# Patient Record
Sex: MALE | Race: WHITE | Employment: FULL TIME | URBAN - METROPOLITAN AREA
[De-identification: names, ages, dates, MRNs, and addresses within clinical notes are randomized per-mention and may not be internally consistent; named-entity substitution may affect disease eponyms.]

---

## 2021-08-09 ENCOUNTER — HOSPITAL ENCOUNTER (INPATIENT)
Age: 21
LOS: 3 days | Discharge: HOME | DRG: 682 | End: 2021-08-12
Attending: EMERGENCY MEDICINE | Admitting: INTERNAL MEDICINE
Payer: COMMERCIAL

## 2021-08-09 ENCOUNTER — APPOINTMENT (OUTPATIENT)
Dept: GENERAL RADIOLOGY | Age: 21
DRG: 682 | End: 2021-08-09
Attending: EMERGENCY MEDICINE
Payer: COMMERCIAL

## 2021-08-09 DIAGNOSIS — N17.9 AKI (ACUTE KIDNEY INJURY) (HCC): ICD-10-CM

## 2021-08-09 DIAGNOSIS — R07.9 CHEST PAIN, UNSPECIFIED TYPE: ICD-10-CM

## 2021-08-09 DIAGNOSIS — I47.1 SVT (SUPRAVENTRICULAR TACHYCARDIA) (HCC): ICD-10-CM

## 2021-08-09 DIAGNOSIS — E87.6 HYPOKALEMIA: Primary | ICD-10-CM

## 2021-08-09 PROBLEM — R00.0 TACHYCARDIA: Status: ACTIVE | Noted: 2021-08-09

## 2021-08-09 LAB
ALBUMIN SERPL-MCNC: 3.8 G/DL (ref 3.5–5)
ALBUMIN/GLOB SERPL: 1.3 {RATIO} (ref 1.1–2.2)
ALP SERPL-CCNC: 110 U/L (ref 45–117)
ALT SERPL-CCNC: 34 U/L (ref 12–78)
AMPHET UR QL SCN: NEGATIVE
ANION GAP SERPL CALC-SCNC: 13 MMOL/L (ref 5–15)
AST SERPL-CCNC: 20 U/L (ref 15–37)
ATRIAL RATE: 122 BPM
ATRIAL RATE: 136 BPM
BARBITURATES UR QL SCN: NEGATIVE
BASOPHILS # BLD: 0.1 K/UL (ref 0–0.1)
BASOPHILS NFR BLD: 1 % (ref 0–1)
BENZODIAZ UR QL: NEGATIVE
BILIRUB SERPL-MCNC: 0.3 MG/DL (ref 0.2–1)
BUN SERPL-MCNC: 20 MG/DL (ref 6–20)
BUN/CREAT SERPL: 12 (ref 12–20)
CALCIUM SERPL-MCNC: 8.4 MG/DL (ref 8.5–10.1)
CALCULATED P AXIS, ECG09: 68 DEGREES
CALCULATED P AXIS, ECG09: 80 DEGREES
CALCULATED R AXIS, ECG10: 94 DEGREES
CALCULATED R AXIS, ECG10: 97 DEGREES
CALCULATED T AXIS, ECG11: -17 DEGREES
CALCULATED T AXIS, ECG11: -9 DEGREES
CANNABINOIDS UR QL SCN: NEGATIVE
CHLORIDE SERPL-SCNC: 108 MMOL/L (ref 97–108)
CO2 SERPL-SCNC: 21 MMOL/L (ref 21–32)
COCAINE UR QL SCN: NEGATIVE
CREAT SERPL-MCNC: 1.61 MG/DL (ref 0.7–1.3)
DIAGNOSIS, 93000: NORMAL
DIAGNOSIS, 93000: NORMAL
DIFFERENTIAL METHOD BLD: ABNORMAL
DRUG SCRN COMMENT,DRGCM: NORMAL
EOSINOPHIL # BLD: 0 K/UL (ref 0–0.4)
EOSINOPHIL NFR BLD: 0 % (ref 0–7)
ERYTHROCYTE [DISTWIDTH] IN BLOOD BY AUTOMATED COUNT: 13.4 % (ref 11.5–14.5)
GLOBULIN SER CALC-MCNC: 2.9 G/DL (ref 2–4)
GLUCOSE SERPL-MCNC: 275 MG/DL (ref 65–100)
HCT VFR BLD AUTO: 48.1 % (ref 36.6–50.3)
HGB BLD-MCNC: 16.2 G/DL (ref 12.1–17)
IMM GRANULOCYTES # BLD AUTO: 0.1 K/UL (ref 0–0.04)
IMM GRANULOCYTES NFR BLD AUTO: 1 % (ref 0–0.5)
LYMPHOCYTES # BLD: 1.9 K/UL (ref 0.8–3.5)
LYMPHOCYTES NFR BLD: 11 % (ref 12–49)
MCH RBC QN AUTO: 29.6 PG (ref 26–34)
MCHC RBC AUTO-ENTMCNC: 33.7 G/DL (ref 30–36.5)
MCV RBC AUTO: 87.9 FL (ref 80–99)
METHADONE UR QL: NEGATIVE
MONOCYTES # BLD: 1.3 K/UL (ref 0–1)
MONOCYTES NFR BLD: 8 % (ref 5–13)
NEUTS SEG # BLD: 13.8 K/UL (ref 1.8–8)
NEUTS SEG NFR BLD: 79 % (ref 32–75)
NRBC # BLD: 0 K/UL (ref 0–0.01)
NRBC BLD-RTO: 0 PER 100 WBC
OPIATES UR QL: NEGATIVE
P-R INTERVAL, ECG05: 128 MS
P-R INTERVAL, ECG05: 134 MS
PCP UR QL: NEGATIVE
PLATELET # BLD AUTO: 239 K/UL (ref 150–400)
PMV BLD AUTO: 10.8 FL (ref 8.9–12.9)
POTASSIUM SERPL-SCNC: 2.1 MMOL/L (ref 3.5–5.1)
PROT SERPL-MCNC: 6.7 G/DL (ref 6.4–8.2)
Q-T INTERVAL, ECG07: 312 MS
Q-T INTERVAL, ECG07: 360 MS
QRS DURATION, ECG06: 100 MS
QRS DURATION, ECG06: 104 MS
QTC CALCULATION (BEZET), ECG08: 469 MS
QTC CALCULATION (BEZET), ECG08: 513 MS
RBC # BLD AUTO: 5.47 M/UL (ref 4.1–5.7)
SODIUM SERPL-SCNC: 142 MMOL/L (ref 136–145)
TROPONIN I SERPL-MCNC: <0.05 NG/ML
TSH SERPL DL<=0.05 MIU/L-ACNC: 2.09 UIU/ML (ref 0.36–3.74)
VENTRICULAR RATE, ECG03: 122 BPM
VENTRICULAR RATE, ECG03: 136 BPM
WBC # BLD AUTO: 17.2 K/UL (ref 4.1–11.1)

## 2021-08-09 PROCEDURE — 80307 DRUG TEST PRSMV CHEM ANLYZR: CPT

## 2021-08-09 PROCEDURE — 74011250637 HC RX REV CODE- 250/637: Performed by: INTERNAL MEDICINE

## 2021-08-09 PROCEDURE — 36415 COLL VENOUS BLD VENIPUNCTURE: CPT

## 2021-08-09 PROCEDURE — 96367 TX/PROPH/DG ADDL SEQ IV INF: CPT

## 2021-08-09 PROCEDURE — 93005 ELECTROCARDIOGRAM TRACING: CPT

## 2021-08-09 PROCEDURE — 80053 COMPREHEN METABOLIC PANEL: CPT

## 2021-08-09 PROCEDURE — 74011250637 HC RX REV CODE- 250/637: Performed by: EMERGENCY MEDICINE

## 2021-08-09 PROCEDURE — 74011250636 HC RX REV CODE- 250/636: Performed by: EMERGENCY MEDICINE

## 2021-08-09 PROCEDURE — 74011000250 HC RX REV CODE- 250: Performed by: EMERGENCY MEDICINE

## 2021-08-09 PROCEDURE — 96365 THER/PROPH/DIAG IV INF INIT: CPT

## 2021-08-09 PROCEDURE — 85025 COMPLETE CBC W/AUTO DIFF WBC: CPT

## 2021-08-09 PROCEDURE — 74011250636 HC RX REV CODE- 250/636: Performed by: INTERNAL MEDICINE

## 2021-08-09 PROCEDURE — 96376 TX/PRO/DX INJ SAME DRUG ADON: CPT

## 2021-08-09 PROCEDURE — 65270000029 HC RM PRIVATE

## 2021-08-09 PROCEDURE — 96375 TX/PRO/DX INJ NEW DRUG ADDON: CPT

## 2021-08-09 PROCEDURE — 99285 EMERGENCY DEPT VISIT HI MDM: CPT

## 2021-08-09 PROCEDURE — 84484 ASSAY OF TROPONIN QUANT: CPT

## 2021-08-09 PROCEDURE — 84443 ASSAY THYROID STIM HORMONE: CPT

## 2021-08-09 PROCEDURE — 71045 X-RAY EXAM CHEST 1 VIEW: CPT

## 2021-08-09 PROCEDURE — 99218 HC RM OBSERVATION: CPT

## 2021-08-09 PROCEDURE — 74011250636 HC RX REV CODE- 250/636

## 2021-08-09 RX ORDER — HEPARIN SODIUM 5000 [USP'U]/ML
5000 INJECTION, SOLUTION INTRAVENOUS; SUBCUTANEOUS EVERY 12 HOURS
Status: DISCONTINUED | OUTPATIENT
Start: 2021-08-10 | End: 2021-08-10

## 2021-08-09 RX ORDER — LORAZEPAM 2 MG/ML
1 INJECTION INTRAMUSCULAR
Status: COMPLETED | OUTPATIENT
Start: 2021-08-09 | End: 2021-08-09

## 2021-08-09 RX ORDER — SODIUM CHLORIDE 9 MG/ML
150 INJECTION, SOLUTION INTRAVENOUS CONTINUOUS
Status: DISCONTINUED | OUTPATIENT
Start: 2021-08-10 | End: 2021-08-10

## 2021-08-09 RX ORDER — POTASSIUM CHLORIDE AND SODIUM CHLORIDE 900; 300 MG/100ML; MG/100ML
INJECTION, SOLUTION INTRAVENOUS CONTINUOUS
Status: DISCONTINUED | OUTPATIENT
Start: 2021-08-09 | End: 2021-08-09

## 2021-08-09 RX ORDER — SODIUM CHLORIDE 0.9 % (FLUSH) 0.9 %
5-40 SYRINGE (ML) INJECTION AS NEEDED
Status: DISCONTINUED | OUTPATIENT
Start: 2021-08-09 | End: 2021-08-12 | Stop reason: HOSPADM

## 2021-08-09 RX ORDER — POTASSIUM CHLORIDE 7.45 MG/ML
10 INJECTION INTRAVENOUS
Status: COMPLETED | OUTPATIENT
Start: 2021-08-09 | End: 2021-08-09

## 2021-08-09 RX ORDER — ONDANSETRON 4 MG/1
4 TABLET, ORALLY DISINTEGRATING ORAL
Status: DISCONTINUED | OUTPATIENT
Start: 2021-08-09 | End: 2021-08-12 | Stop reason: HOSPADM

## 2021-08-09 RX ORDER — ONDANSETRON 2 MG/ML
4 INJECTION INTRAMUSCULAR; INTRAVENOUS
Status: DISCONTINUED | OUTPATIENT
Start: 2021-08-09 | End: 2021-08-12 | Stop reason: HOSPADM

## 2021-08-09 RX ORDER — MAGNESIUM SULFATE HEPTAHYDRATE 40 MG/ML
2 INJECTION, SOLUTION INTRAVENOUS ONCE
Status: COMPLETED | OUTPATIENT
Start: 2021-08-09 | End: 2021-08-09

## 2021-08-09 RX ORDER — POTASSIUM CHLORIDE 750 MG/1
40 TABLET, FILM COATED, EXTENDED RELEASE ORAL ONCE
Status: COMPLETED | OUTPATIENT
Start: 2021-08-09 | End: 2021-08-09

## 2021-08-09 RX ORDER — ACETAMINOPHEN 650 MG/1
650 SUPPOSITORY RECTAL
Status: DISCONTINUED | OUTPATIENT
Start: 2021-08-09 | End: 2021-08-12 | Stop reason: HOSPADM

## 2021-08-09 RX ORDER — ENOXAPARIN SODIUM 100 MG/ML
40 INJECTION SUBCUTANEOUS DAILY
Status: DISCONTINUED | OUTPATIENT
Start: 2021-08-10 | End: 2021-08-09

## 2021-08-09 RX ORDER — ONDANSETRON 2 MG/ML
4 INJECTION INTRAMUSCULAR; INTRAVENOUS ONCE
Status: COMPLETED | OUTPATIENT
Start: 2021-08-09 | End: 2021-08-09

## 2021-08-09 RX ORDER — METOPROLOL TARTRATE 5 MG/5ML
2.5 INJECTION INTRAVENOUS
Status: COMPLETED | OUTPATIENT
Start: 2021-08-09 | End: 2021-08-09

## 2021-08-09 RX ORDER — ONDANSETRON 2 MG/ML
INJECTION INTRAMUSCULAR; INTRAVENOUS
Status: COMPLETED
Start: 2021-08-09 | End: 2021-08-09

## 2021-08-09 RX ORDER — SODIUM CHLORIDE 0.9 % (FLUSH) 0.9 %
5-40 SYRINGE (ML) INJECTION EVERY 8 HOURS
Status: DISCONTINUED | OUTPATIENT
Start: 2021-08-09 | End: 2021-08-12 | Stop reason: HOSPADM

## 2021-08-09 RX ORDER — ONDANSETRON 2 MG/ML
4 INJECTION INTRAMUSCULAR; INTRAVENOUS
Status: DISCONTINUED | OUTPATIENT
Start: 2021-08-09 | End: 2021-08-09

## 2021-08-09 RX ORDER — POLYETHYLENE GLYCOL 3350 17 G/17G
17 POWDER, FOR SOLUTION ORAL DAILY PRN
Status: DISCONTINUED | OUTPATIENT
Start: 2021-08-09 | End: 2021-08-12 | Stop reason: HOSPADM

## 2021-08-09 RX ORDER — ACETAMINOPHEN 325 MG/1
650 TABLET ORAL
Status: DISCONTINUED | OUTPATIENT
Start: 2021-08-09 | End: 2021-08-12 | Stop reason: HOSPADM

## 2021-08-09 RX ADMIN — POTASSIUM CHLORIDE AND SODIUM CHLORIDE: 900; 300 INJECTION, SOLUTION INTRAVENOUS at 14:01

## 2021-08-09 RX ADMIN — SODIUM CHLORIDE 1000 ML: 9 INJECTION, SOLUTION INTRAVENOUS at 10:17

## 2021-08-09 RX ADMIN — LORAZEPAM 1 MG: 2 INJECTION INTRAMUSCULAR; INTRAVENOUS at 09:07

## 2021-08-09 RX ADMIN — ONDANSETRON 4 MG: 2 INJECTION INTRAMUSCULAR; INTRAVENOUS at 09:02

## 2021-08-09 RX ADMIN — SODIUM CHLORIDE 1000 ML: 9 INJECTION, SOLUTION INTRAVENOUS at 14:23

## 2021-08-09 RX ADMIN — MAGNESIUM SULFATE HEPTAHYDRATE 2 G: 40 INJECTION, SOLUTION INTRAVENOUS at 10:23

## 2021-08-09 RX ADMIN — POTASSIUM BICARBONATE 40 MEQ: 782 TABLET, EFFERVESCENT ORAL at 10:20

## 2021-08-09 RX ADMIN — ONDANSETRON 4 MG: 4 TABLET, ORALLY DISINTEGRATING ORAL at 16:08

## 2021-08-09 RX ADMIN — LORAZEPAM 1 MG: 2 INJECTION INTRAMUSCULAR; INTRAVENOUS at 09:48

## 2021-08-09 RX ADMIN — LORAZEPAM 1 MG: 2 INJECTION INTRAMUSCULAR; INTRAVENOUS at 10:19

## 2021-08-09 RX ADMIN — METOPROLOL TARTRATE 2.5 MG: 5 INJECTION INTRAVENOUS at 11:25

## 2021-08-09 RX ADMIN — POTASSIUM CHLORIDE 10 MEQ: 7.46 INJECTION, SOLUTION INTRAVENOUS at 10:20

## 2021-08-09 RX ADMIN — POTASSIUM CHLORIDE 40 MEQ: 750 TABLET, FILM COATED, EXTENDED RELEASE ORAL at 14:15

## 2021-08-09 RX ADMIN — SODIUM CHLORIDE 1000 ML: 9 INJECTION, SOLUTION INTRAVENOUS at 09:09

## 2021-08-09 NOTE — PROGRESS NOTES
Pharmacy Clarification of Prior to Admission Medication Regimen     The patient was  interviewed regarding clarification of the prior to admission medication regimen. Patient was questioned regarding use of any other inhalers, topical products, over the counter medications, herbal medications, vitamin products or ophthalmic/nasal/otic medication use. Information Obtained From: Patient    Pertinent Pharmacy Findings:  Updated patients preferred outpatient pharmacy to: [facility] in [facility]  Patient took 1 Anadrol this morning  Were changes made to high alert medication on PTA list? no      PTA medication list was corrected to the following:     Prior to Admission Medications   Prescriptions Last Dose Informant Taking? OTHER  Self Yes   Sig: Take 3 Capsules by mouth daily.  Kleen Extreme (from [facility])      Facility-Administered Medications: None        Thank you,  Matty Carter CPhT  Medication History Pharmacy Technician

## 2021-08-09 NOTE — PROGRESS NOTES
End of Shift Note    Bedside shift change report given to Sapphire Brumfield (oncoming nurse) by Maria C wOens RN (offgoing nurse). Report included the following information SBAR, Kardex, ED Summary, Procedure Summary, Intake/Output, MAR and Recent Results    Shift worked:  7a-7p     Shift summary and any significant changes:     Patient came up to the unit around 3 pm today. Heart rate stayed tachy (120s-140s). Finished his fluid bolus and then started his NS with 40 of K. No complaints of pain. He did have one episode of vomiting which I gave him zofan for. Concerns for physician to address:  none   Zone phone for oncoming shift:   0032       Activity:  Activity Level: Up ad lucas  Number times ambulated in hallways past shift: 0  Number of times OOB to chair past shift: 0    Cardiac:   Cardiac Monitoring: Yes      Cardiac Rhythm: Sinus Tach    Access:   Current line(s): PIV     Genitourinary:   Urinary status: voiding    Respiratory:   O2 Device: None (Room air)  Chronic home O2 use?: NO  Incentive spirometer at bedside: NO     GI:     Current diet:  ADULT DIET Regular  Passing flatus: YES  Tolerating current diet: YES       Pain Management:   Patient states pain is manageable on current regimen: YES    Skin:  Chris Score: 23  Interventions: increase time out of bed    Patient Safety:  Fall Score:  Total Score: 0  Interventions: gripper socks       Length of Stay:  Expected LOS: - - -  Actual LOS: 0      Maria C Owens RN

## 2021-08-09 NOTE — ED NOTES
Assumed care of pt. Pt resting in stretcher in no obvious distress at this point. Rn at bedside medicating pt, stating vagal maneuver performed by MD improved pt HR to 80's however 's at this time. Pt noting intermittent SOB, N/V and jitteriness and CP this morning which is now resolved. 3024 MD at bedside performing vagal maneuver a second time, HR in 82, returning to 120's    0911 Subsequent EKG performed, bolus initiated     0950 Pt medicated per orders. MD notified of hypotension, will continue to monitor - no orders received     1025 Pt reporting some lightheadedness and persistent jitteriness. Pt medicated per orders including ativan. Will notify MD of HR in 150's and sx. 1130 Pt reporting persistent lightheadedness and hypotension, MD aware. Per MD ok to proceed w/ metoprolol as ordered     [phone] w/ hospitalist regarding pt MEWS and hypotension, verbal orders received, third bolus initiated     1440 Patient is being transferred to [facility], Room # 2182. Report given to RN on Caron Ascencio for routine progression of care. Report consisted of the following information SBAR, Kardex, ED Summary, MAR, Recent Results and Cardiac Rhythm intermittently SVT. Patient transferred to receiving unit by: transport (RN or tech name). Outstanding consults needed: No     Next labs due: 0400 am    The following personal items will be sent with the patient during transfer to the floor:     All valuables:    Cardiac monitoring ordered: Yes    The following CURRENT information was reported to the receiving RN:    Code status: Full Code at time of transfer    Last set of vital signs:  Vital Signs  Level of Consciousness: Alert (0) (08/09/21 1400)  Temp: 98.6 °F (37 °C) (08/09/21 1400)  Temp Source: Oral (08/09/21 1400)  Pulse (Heart Rate): (!) 140 (08/09/21 1400)  Cardiac Rhythm: Sinus Tach (08/09/21 0906)  Resp Rate: 24 (08/09/21 1400)  BP: (!) 120/37 (08/09/21 1400)  MAP (Monitor): (!) 59 (08/09/21 1400)  MAP (Calculated): 65 (08/09/21 1400)  MEWS Score: 5 (08/09/21 1400)         Oxygen Therapy  O2 Sat (%): 99 % (08/09/21 1400)  Pulse via Oximetry: 138 beats per minute (08/09/21 1400)  O2 Device: None (Room air) (08/09/21 0905)      Last pain assessment:  Pain 1  Pain Scale 1: Numeric (0 - 10)  Pain Intensity 1: 0 (pain was elevated earlier. )      Wounds: No     Urinary catheter: voiding  Is there a box order: No     LDAs:       Peripheral IV 08/09/21 Left Antecubital (Active)   Site Assessment Clean, dry, & intact 08/09/21 0857   Phlebitis Assessment 0 08/09/21 0857   Infiltration Assessment 0 08/09/21 0857   Dressing Status Clean, dry, & intact 08/09/21 0857   Dressing Type Transparent 08/09/21 0857   Hub Color/Line Status Pink;Flushed 08/09/21 0857   Action Taken Blood drawn 08/09/21 0857         Opportunity for questions and clarification was provided.     Suzi Romano RN

## 2021-08-09 NOTE — Clinical Note
TRANSFER - OUT REPORT:     Verbal report given to: jose welch. Report consisted of patient's Situation, Background, Assessment and   Recommendations(SBAR). Opportunity for questions and clarification was provided. Patient transported with a Registered Nurse. Patient transported to: [facility].

## 2021-08-09 NOTE — ED PROVIDER NOTES
EMERGENCY DEPARTMENT HISTORY AND PHYSICAL EXAM      Date: 8/9/2021  Patient Name: Zaina Moore    History of Presenting Illness     Chief Complaint   Patient presents with    Chest Pain     took a Anadrol this morning at 630am and began with chest pain right away with vomiting.  Vomiting     pt tongue pale sticky       History Provided By: Patient    HPI: Zaina Moore, 23 y.o. male presents to the ED with cc of nausea. Previously healthy 58-year-old male presents emergency department with a chief complaint of nausea and vomiting. Patient reports he was in his normal state of health. At approximately [address] patient reports he woke up and took a preworkout supplement called \"anadrol. \"  He reports he began to feel shaky with palpitations and nausea/vomiting. Denies any history of similar. Denies any family history of cardiac dysrhythmias. Patient denies any fevers or chills, chest pain, headaches, diarrhea. Does report feeling lightheaded. There are no other complaints, changes, or physical findings at this time. PCP: None    No current facility-administered medications on file prior to encounter. Current Outpatient Medications on File Prior to Encounter   Medication Sig Dispense Refill    OTHER Take 3 Capsules by mouth daily. Regenerate (from [employer])         Past History     Past Medical History:  Denies    Past Surgical History:  Denies    Family History:  No family history on file. Social History:    Denies tobacco use, alcohol use, + anabolic steroid abuse    Allergies:  No Known Allergies      Review of Systems   Review of Systems   Constitutional: Negative for fever. HENT: Negative for voice change. Eyes: Negative for pain and redness. Respiratory: Positive for shortness of breath. Negative for cough and chest tightness. Cardiovascular: Positive for palpitations. Negative for chest pain and leg swelling.    Gastrointestinal: Negative for abdominal pain, diarrhea, nausea and vomiting. Genitourinary: Negative for hematuria. Musculoskeletal: Negative for gait problem. Skin: Negative for color change, pallor and rash. Neurological: Positive for light-headedness. Negative for facial asymmetry, weakness and headaches. Hematological: Does not bruise/bleed easily. Psychiatric/Behavioral: Negative for behavioral problems. All other systems reviewed and are negative. Physical Exam   Physical Exam  Vitals and nursing note reviewed. Constitutional:       Comments: 18-year-old male, resting in bed, nontoxic and in no distress   HENT:      Head: Normocephalic. Right Ear: External ear normal.      Left Ear: External ear normal.      Nose: Nose normal.   Eyes:      Conjunctiva/sclera: Conjunctivae normal.   Cardiovascular:      Rate and Rhythm: Regular rhythm. Tachycardia present. Pulses:           Radial pulses are 2+ on the right side and 2+ on the left side. Heart sounds: No murmur heard. No friction rub. No gallop. Pulmonary:      Effort: Pulmonary effort is normal.      Breath sounds: Normal breath sounds. No wheezing, rhonchi or rales. Abdominal:      Palpations: Abdomen is soft. Tenderness: There is no abdominal tenderness. Musculoskeletal:         General: Normal range of motion. Right lower leg: No edema. Left lower leg: No edema. Skin:     General: Skin is warm. Capillary Refill: Capillary refill takes less than 2 seconds. Neurological:      Mental Status: He is alert. Mental status is at baseline.    Psychiatric:         Mood and Affect: Mood normal.         Behavior: Behavior normal.         Diagnostic Study Results     Labs -     Recent Results (from the past 12 hour(s))   EKG, 12 LEAD, INITIAL    Collection Time: 08/09/21  8:42 AM   Result Value Ref Range    Ventricular Rate 136 BPM    Atrial Rate 136 BPM    P-R Interval 134 ms    QRS Duration 100 ms    Q-T Interval 312 ms    QTC Calculation (Bezet) 469 ms Calculated P Axis 80 degrees    Calculated R Axis 97 degrees    Calculated T Axis -9 degrees    Diagnosis       Sinus tachycardia  Normal except for the rate  No previous ECGs available  Recommend repeat  Confirmed by Alfonso Tavarez ([id number]) on 8/9/2021 4:36:03 PM     CBC WITH AUTOMATED DIFF    Collection Time: 08/09/21  8:59 AM   Result Value Ref Range    WBC 17.2 (H) 4.1 - 11.1 K/uL    RBC 5.47 4.10 - 5.70 M/uL    HGB 16.2 12.1 - 17.0 g/dL    HCT 48.1 36.6 - 50.3 %    MCV 87.9 80.0 - 99.0 FL    MCH 29.6 26.0 - 34.0 PG    MCHC 33.7 30.0 - 36.5 g/dL    RDW 13.4 11.5 - 14.5 %    PLATELET 118 608 - 259 K/uL    MPV 10.8 8.9 - 12.9 FL    NRBC 0.0 0  WBC    ABSOLUTE NRBC 0.00 0.00 - 0.01 K/uL    NEUTROPHILS 79 (H) 32 - 75 %    LYMPHOCYTES 11 (L) 12 - 49 %    MONOCYTES 8 5 - 13 %    EOSINOPHILS 0 0 - 7 %    BASOPHILS 1 0 - 1 %    IMMATURE GRANULOCYTES 1 (H) 0.0 - 0.5 %    ABS. NEUTROPHILS 13.8 (H) 1.8 - 8.0 K/UL    ABS. LYMPHOCYTES 1.9 0.8 - 3.5 K/UL    ABS. MONOCYTES 1.3 (H) 0.0 - 1.0 K/UL    ABS. EOSINOPHILS 0.0 0.0 - 0.4 K/UL    ABS. BASOPHILS 0.1 0.0 - 0.1 K/UL    ABS. IMM. GRANS. 0.1 (H) 0.00 - 0.04 K/UL    DF AUTOMATED     METABOLIC PANEL, COMPREHENSIVE    Collection Time: 08/09/21  8:59 AM   Result Value Ref Range    Sodium 142 136 - 145 mmol/L    Potassium 2.1 (LL) 3.5 - 5.1 mmol/L    Chloride 108 97 - 108 mmol/L    CO2 21 21 - 32 mmol/L    Anion gap 13 5 - 15 mmol/L    Glucose 275 (H) 65 - 100 mg/dL    BUN 20 6 - 20 MG/DL    Creatinine 1.61 (H) 0.70 - 1.30 MG/DL    BUN/Creatinine ratio 12 12 - 20      GFR est AA >60 >60 ml/min/1.73m2    GFR est non-AA 54 (L) >60 ml/min/1.73m2    Calcium 8.4 (L) 8.5 - 10.1 MG/DL    Bilirubin, total 0.3 0.2 - 1.0 MG/DL    ALT (SGPT) 34 12 - 78 U/L    AST (SGOT) 20 15 - 37 U/L    Alk.  phosphatase 110 45 - 117 U/L    Protein, total 6.7 6.4 - 8.2 g/dL    Albumin 3.8 3.5 - 5.0 g/dL    Globulin 2.9 2.0 - 4.0 g/dL    A-G Ratio 1.3 1.1 - 2.2     TROPONIN I    Collection Time: 08/09/21  8:59 AM   Result Value Ref Range    Troponin-I, Qt. <0.05 <0.05 ng/mL   TSH 3RD GENERATION    Collection Time: 08/09/21  8:59 AM   Result Value Ref Range    TSH 2.09 0.36 - 3.74 uIU/mL   EKG, 12 LEAD, SUBSEQUENT    Collection Time: 08/09/21  9:11 AM   Result Value Ref Range    Ventricular Rate 122 BPM    Atrial Rate 122 BPM    P-R Interval 128 ms    QRS Duration 104 ms    Q-T Interval 360 ms    QTC Calculation (Bezet) 513 ms    Calculated P Axis 68 degrees    Calculated R Axis 94 degrees    Calculated T Axis -17 degrees    Diagnosis       Sinus tachycardia  Normal except for the rate    Confirmed by Latonia Urrutia ([id number]) on 8/9/2021 4:36:37 PM     DRUG SCREEN, URINE    Collection Time: 08/09/21  1:08 PM   Result Value Ref Range    AMPHETAMINES Negative NEG      BARBITURATES Negative NEG      BENZODIAZEPINES Negative NEG      COCAINE Negative NEG      METHADONE Negative NEG      OPIATES Negative NEG      PCP(PHENCYCLIDINE) Negative NEG      THC (TH-CANNABINOL) Negative NEG      Drug screen comment (NOTE)        Radiologic Studies -   XR CHEST PORT   Final Result   Clear lungs. CT Results  (Last 48 hours)    None        CXR Results  (Last 48 hours)               08/09/21 1124  XR CHEST PORT Final result    Impression:  Clear lungs. Narrative:  PORTABLE CHEST RADIOGRAPH/S: 8/9/2021 11:24 AM       INDICATION: Chest pain. COMPARISON: None. TECHNIQUE: Portable frontal upright radiograph/s of the chest.       FINDINGS:    The lungs are clear. The central airways are patent. No pneumothorax or pleural   effusion. Medical Decision Making   I am the first provider for this patient. I reviewed the vital signs, available nursing notes, past medical history, past surgical history, family history and social history. Vital Signs-Reviewed the patient's vital signs.   Patient Vitals for the past 12 hrs:   Temp Pulse Resp BP SpO2   08/09/21 1600  (!) 130      08/09/21 1500 98.6 °F (37 °C) (!) 131 18 (!) 128/46 98 %   08/09/21 1400 98.6 °F (37 °C) (!) 140 24 (!) 120/37 99 %   08/09/21 1230  (!) 135 26 (!) 100/32 99 %   08/09/21 1130  (!) 140 28  100 %   08/09/21 1125  (!) 136  (!) 96/32    08/09/21 1015  (!) 127 25 (!) 102/34 100 %   08/09/21 0950  (!) 129 14 (!) 95/31 100 %   08/09/21 0912  (!) 124 23  100 %   08/09/21 0907  (!) 135 15  100 %   08/09/21 0900  (!) 102 11 (!) 113/42 100 %   08/09/21 0848 97.8 °F (36.6 °C) (!) 140 18 115/62 100 %       Records Reviewed: Nursing Notes and Old Medical Records    Provider Notes (Medical Decision Making):     19-year-old male presents with palpitations and shortness of breath after using a preworkout supplement. On arrival he is tachycardic but in no acute distress. On the monitor his heart rate is in her complex tachycardia with heart rate in the 150s. I suspect this may be SVT. REVERT maneuver tried with intermittent pauses and temporary SR. Will medicate with ativan and zofran, observe. Consider adenosine. ED Course:   Initial assessment performed. The patients presenting problems have been discussed, and they are in agreement with the care plan formulated and outlined with them. I have encouraged them to ask questions as they arise throughout their visit. ED Course as of Aug 09 1919   [facility] Aug 09, 2021   4660 Preliminary EKG interpreted by me. Shows sinus tachycardia with a HR of 136. No ST elevations or depressions concerning for ischemia. Normal intervals. [MB]   [id number] Now on the monitor, patient has a heart rate of 130s, appears to be a P wave. [MB]   [id number] Additional revert maneuver tried, patient again had improvement in heart rate to the 90s, appears to enter sinus tachycardia on the monitor after. [MB]   [id number] Preliminary EKG interpreted by me. Shows tachycardia with a HR of 122.   No ST elevations or nonspecific ST depressions    [MB]   0997 Patient reassessed, will tolerate lower blood pressures as patient is asymptomatic. He is resting in bed in no distress. Labs are leukocytosis likely demargination, no fever or infectious causes. There is also hypokalemia which will replete as well as magnesium given patient's tachycardia and likely from vomiting. Creatinine is mildly elevated. [MB]   1024 Calcium is 2.1, we will replete. Patient's heart rate remains elevated in the 130s, sinus rhythm on the monitor. Second bolus of fluids ordered. [MB]   1045 Patient intermittently fluctuates between SVT and sinus tach, we will trial 2.5 mg of metoprolol as patient denies any cocaine. [MB]   3371 Will discuss with hospitalist for observation. States he is unremarkable. [MB]      ED Course User Index  [MB] Angela Jean MD     Critical Care Time:   CRITICAL CARE NOTE :    8:54 AM    IMPENDING DETERIORATION -Cardiovascular, Metabolic and Renal  ASSOCIATED RISK FACTORS - Hypotension, Dysrhythmia, Metabolic changes and Dehydration  MANAGEMENT- Bedside Assessment  INTERPRETATION -  Xrays, ECG and Blood Pressure  INTERVENTIONS - hemodynamic mngmt, Metobolic interventions and vagal manuevers  CASE REVIEW - Hospitalist/Intensivist and Nursing  TREATMENT RESPONSE -Stable  PERFORMED BY - Self    NOTES   :  I have spent 41 minutes of critical care time involved in lab review, consultations with specialist, family decision- making, bedside attention and documentation. This time excludes time spent in any separate billed procedures. During this entire length of time I was immediately available to the patient . Iva Gupta MD      Disposition:    Admitted      Diagnosis     Clinical Impression:   1. Hypokalemia    2. SVT (supraventricular tachycardia) ([facility] [address]    3. PAZ (acute kidney injury) [facility])        Attestations:    Iva Gupta MD    Please note that this dictation was completed with [employer], the [employer] voice recognition software.   Quite often unanticipated grammatical, syntax, homophones, and other interpretive errors are inadvertently transcribed by the computer software. Please disregard these errors. Please excuse any errors that have escaped final proofreading. Thank you.

## 2021-08-10 ENCOUNTER — APPOINTMENT (OUTPATIENT)
Dept: CT IMAGING | Age: 21
DRG: 682 | End: 2021-08-10
Attending: INTERNAL MEDICINE
Payer: COMMERCIAL

## 2021-08-10 ENCOUNTER — APPOINTMENT (OUTPATIENT)
Dept: NON INVASIVE DIAGNOSTICS | Age: 21
DRG: 682 | End: 2021-08-10
Attending: INTERNAL MEDICINE
Payer: COMMERCIAL

## 2021-08-10 LAB
ALBUMIN SERPL-MCNC: 3 G/DL (ref 3.5–5)
ALBUMIN/GLOB SERPL: 1 {RATIO} (ref 1.1–2.2)
ALP SERPL-CCNC: 64 U/L (ref 45–117)
ALT SERPL-CCNC: 31 U/L (ref 12–78)
ANION GAP SERPL CALC-SCNC: 6 MMOL/L (ref 5–15)
ANION GAP SERPL CALC-SCNC: 8 MMOL/L (ref 5–15)
APPEARANCE UR: CLEAR
AST SERPL-CCNC: 39 U/L (ref 15–37)
BACTERIA URNS QL MICRO: NEGATIVE /HPF
BASOPHILS # BLD: 0.1 K/UL (ref 0–0.1)
BASOPHILS # BLD: 0.1 K/UL (ref 0–0.1)
BASOPHILS NFR BLD: 1 % (ref 0–1)
BASOPHILS NFR BLD: 1 % (ref 0–1)
BILIRUB SERPL-MCNC: 0.8 MG/DL (ref 0.2–1)
BILIRUB UR QL: NEGATIVE
BUN SERPL-MCNC: 10 MG/DL (ref 6–20)
BUN SERPL-MCNC: 11 MG/DL (ref 6–20)
BUN/CREAT SERPL: 11 (ref 12–20)
BUN/CREAT SERPL: 9 (ref 12–20)
CALCIUM SERPL-MCNC: 7.8 MG/DL (ref 8.5–10.1)
CALCIUM SERPL-MCNC: 8 MG/DL (ref 8.5–10.1)
CHLORIDE SERPL-SCNC: 111 MMOL/L (ref 97–108)
CHLORIDE SERPL-SCNC: 112 MMOL/L (ref 97–108)
CK MB CFR SERPL CALC: 0.7 % (ref 0–2.5)
CK MB SERPL-MCNC: 8.1 NG/ML (ref 5–25)
CK SERPL-CCNC: 1111 U/L (ref 39–308)
CO2 SERPL-SCNC: 20 MMOL/L (ref 21–32)
CO2 SERPL-SCNC: 23 MMOL/L (ref 21–32)
COLOR UR: NORMAL
COMMENT, HOLDF: NORMAL
CREAT SERPL-MCNC: 0.97 MG/DL (ref 0.7–1.3)
CREAT SERPL-MCNC: 1.15 MG/DL (ref 0.7–1.3)
DIFFERENTIAL METHOD BLD: ABNORMAL
DIFFERENTIAL METHOD BLD: ABNORMAL
ECHO AV AREA PEAK VELOCITY: 2.39 CM2
ECHO AV AREA/BSA PEAK VELOCITY: 1.3 CM2/M2
ECHO AV CUSP MM: 1.6 CM
ECHO AV PEAK GRADIENT: 14.76 MMHG
ECHO AV PEAK VELOCITY: 192.1 CM/S
ECHO LA AREA 4C: 17.52 CM2
ECHO LA MAJOR AXIS: 4.07 CM
ECHO LA MINOR AXIS: 2.2 CM
ECHO LA TO AORTIC ROOT RATIO: 1.13
ECHO LA TO AORTIC ROOT RATIO: 1.13
ECHO LA VOL 2C: 64.89 ML (ref 18–58)
ECHO LA VOL 4C: 42.3 ML (ref 18–58)
ECHO LA VOL BP: 56.63 ML (ref 18–58)
ECHO LA VOL/BSA BIPLANE: 30.61 ML/M2 (ref 16–28)
ECHO LA VOLUME INDEX A2C: 35.08 ML/M2 (ref 16–28)
ECHO LA VOLUME INDEX A4C: 22.86 ML/M2 (ref 16–28)
ECHO LV E' LATERAL VELOCITY: 15.1 CM/S
ECHO LV E' SEPTAL VELOCITY: 16.03 CM/S
ECHO LV INTERNAL DIMENSION DIASTOLIC: 4.55 CM (ref 4.2–5.9)
ECHO LV INTERNAL DIMENSION SYSTOLIC: 3.06 CM
ECHO LV IVSD: 1.11 CM (ref 0.6–1)
ECHO LV IVSS: 1.53 CM
ECHO LV MASS 2D: 177 G (ref 88–224)
ECHO LV MASS INDEX 2D: 95.7 G/M2 (ref 49–115)
ECHO LV POSTERIOR WALL DIASTOLIC: 1.08 CM (ref 0.6–1)
ECHO LV POSTERIOR WALL SYSTOLIC: 1.7 CM
ECHO LVOT DIAM: 2.06 CM
ECHO LVOT PEAK GRADIENT: 7.53 MMHG
ECHO LVOT PEAK VELOCITY: 137.25 CM/S
ECHO MV A VELOCITY: 93.08 CM/S
ECHO MV E DECELERATION TIME (DT): 145.59 MS
ECHO MV E VELOCITY: 120.96 CM/S
ECHO MV E/A RATIO: 1.3
ECHO MV E/E' LATERAL: 8.01
ECHO MV E/E' RATIO (AVERAGED): 7.78
ECHO MV E/E' SEPTAL: 7.55
ECHO PV MAX VELOCITY: 139.68 CM/S
ECHO PV PEAK INSTANTANEOUS GRADIENT SYSTOLIC: 7.82 MMHG
ECHO RV INTERNAL DIMENSION: 3.36 CM
EOSINOPHIL # BLD: 0 K/UL (ref 0–0.4)
EOSINOPHIL # BLD: 0.1 K/UL (ref 0–0.4)
EOSINOPHIL NFR BLD: 0 % (ref 0–7)
EOSINOPHIL NFR BLD: 0 % (ref 0–7)
EPITH CASTS URNS QL MICRO: NORMAL /LPF
ERYTHROCYTE [DISTWIDTH] IN BLOOD BY AUTOMATED COUNT: 14 % (ref 11.5–14.5)
ERYTHROCYTE [DISTWIDTH] IN BLOOD BY AUTOMATED COUNT: 14.3 % (ref 11.5–14.5)
GLOBULIN SER CALC-MCNC: 3 G/DL (ref 2–4)
GLUCOSE SERPL-MCNC: 117 MG/DL (ref 65–100)
GLUCOSE SERPL-MCNC: 94 MG/DL (ref 65–100)
GLUCOSE UR STRIP.AUTO-MCNC: NEGATIVE MG/DL
HCT VFR BLD AUTO: 40.7 % (ref 36.6–50.3)
HCT VFR BLD AUTO: 44.8 % (ref 36.6–50.3)
HGB BLD-MCNC: 13.2 G/DL (ref 12.1–17)
HGB BLD-MCNC: 14.9 G/DL (ref 12.1–17)
HGB UR QL STRIP: NEGATIVE
HYALINE CASTS URNS QL MICRO: NORMAL /LPF (ref 0–5)
IMM GRANULOCYTES # BLD AUTO: 0 K/UL (ref 0–0.04)
IMM GRANULOCYTES # BLD AUTO: 0.1 K/UL (ref 0–0.04)
IMM GRANULOCYTES NFR BLD AUTO: 0 % (ref 0–0.5)
IMM GRANULOCYTES NFR BLD AUTO: 0 % (ref 0–0.5)
KETONES UR QL STRIP.AUTO: NEGATIVE MG/DL
LACTATE SERPL-SCNC: 1.4 MMOL/L (ref 0.4–2)
LEUKOCYTE ESTERASE UR QL STRIP.AUTO: NEGATIVE
LYMPHOCYTES # BLD: 1.7 K/UL (ref 0.8–3.5)
LYMPHOCYTES # BLD: 2.2 K/UL (ref 0.8–3.5)
LYMPHOCYTES NFR BLD: 12 % (ref 12–49)
LYMPHOCYTES NFR BLD: 14 % (ref 12–49)
MAGNESIUM SERPL-MCNC: 2.4 MG/DL (ref 1.6–2.4)
MCH RBC QN AUTO: 28.8 PG (ref 26–34)
MCH RBC QN AUTO: 29.1 PG (ref 26–34)
MCHC RBC AUTO-ENTMCNC: 32.4 G/DL (ref 30–36.5)
MCHC RBC AUTO-ENTMCNC: 33.3 G/DL (ref 30–36.5)
MCV RBC AUTO: 87.5 FL (ref 80–99)
MCV RBC AUTO: 88.9 FL (ref 80–99)
MONOCYTES # BLD: 0.9 K/UL (ref 0–1)
MONOCYTES # BLD: 1.1 K/UL (ref 0–1)
MONOCYTES NFR BLD: 6 % (ref 5–13)
MONOCYTES NFR BLD: 8 % (ref 5–13)
NEUTS SEG # BLD: 11.4 K/UL (ref 1.8–8)
NEUTS SEG # BLD: 12.3 K/UL (ref 1.8–8)
NEUTS SEG NFR BLD: 79 % (ref 32–75)
NEUTS SEG NFR BLD: 79 % (ref 32–75)
NITRITE UR QL STRIP.AUTO: NEGATIVE
NRBC # BLD: 0 K/UL (ref 0–0.01)
NRBC # BLD: 0 K/UL (ref 0–0.01)
NRBC BLD-RTO: 0 PER 100 WBC
NRBC BLD-RTO: 0 PER 100 WBC
PH UR STRIP: 7 [PH] (ref 5–8)
PLATELET # BLD AUTO: 215 K/UL (ref 150–400)
PLATELET # BLD AUTO: 224 K/UL (ref 150–400)
PMV BLD AUTO: 10.2 FL (ref 8.9–12.9)
PMV BLD AUTO: 10.7 FL (ref 8.9–12.9)
POTASSIUM SERPL-SCNC: 3.6 MMOL/L (ref 3.5–5.1)
POTASSIUM SERPL-SCNC: 3.8 MMOL/L (ref 3.5–5.1)
PROCALCITONIN SERPL-MCNC: 0.87 NG/ML
PROT SERPL-MCNC: 6 G/DL (ref 6.4–8.2)
PROT UR STRIP-MCNC: NEGATIVE MG/DL
RBC # BLD AUTO: 4.58 M/UL (ref 4.1–5.7)
RBC # BLD AUTO: 5.12 M/UL (ref 4.1–5.7)
RBC #/AREA URNS HPF: NORMAL /HPF (ref 0–5)
RBC MORPH BLD: ABNORMAL
SAMPLES BEING HELD,HOLD: NORMAL
SODIUM SERPL-SCNC: 140 MMOL/L (ref 136–145)
SODIUM SERPL-SCNC: 140 MMOL/L (ref 136–145)
SP GR UR REFRACTOMETRY: 1.03 (ref 1–1.03)
TROPONIN I SERPL-MCNC: 2.02 NG/ML
UA: UC IF INDICATED,UAUC: NORMAL
UROBILINOGEN UR QL STRIP.AUTO: 1 EU/DL (ref 0.2–1)
WBC # BLD AUTO: 14.3 K/UL (ref 4.1–11.1)
WBC # BLD AUTO: 15.6 K/UL (ref 4.1–11.1)
WBC URNS QL MICRO: NORMAL /HPF (ref 0–4)

## 2021-08-10 PROCEDURE — 74011250636 HC RX REV CODE- 250/636: Performed by: INTERNAL MEDICINE

## 2021-08-10 PROCEDURE — 85025 COMPLETE CBC W/AUTO DIFF WBC: CPT

## 2021-08-10 PROCEDURE — 83036 HEMOGLOBIN GLYCOSYLATED A1C: CPT

## 2021-08-10 PROCEDURE — 83735 ASSAY OF MAGNESIUM: CPT

## 2021-08-10 PROCEDURE — 93306 TTE W/DOPPLER COMPLETE: CPT

## 2021-08-10 PROCEDURE — 84145 PROCALCITONIN (PCT): CPT

## 2021-08-10 PROCEDURE — 83605 ASSAY OF LACTIC ACID: CPT

## 2021-08-10 PROCEDURE — 36415 COLL VENOUS BLD VENIPUNCTURE: CPT

## 2021-08-10 PROCEDURE — 65270000029 HC RM PRIVATE

## 2021-08-10 PROCEDURE — 84484 ASSAY OF TROPONIN QUANT: CPT

## 2021-08-10 PROCEDURE — 65660000000 HC RM CCU STEPDOWN

## 2021-08-10 PROCEDURE — 74011000258 HC RX REV CODE- 258: Performed by: INTERNAL MEDICINE

## 2021-08-10 PROCEDURE — 74011250637 HC RX REV CODE- 250/637: Performed by: INTERNAL MEDICINE

## 2021-08-10 PROCEDURE — 82550 ASSAY OF CK (CPK): CPT

## 2021-08-10 PROCEDURE — 81001 URINALYSIS AUTO W/SCOPE: CPT

## 2021-08-10 PROCEDURE — 87040 BLOOD CULTURE FOR BACTERIA: CPT

## 2021-08-10 PROCEDURE — 71275 CT ANGIOGRAPHY CHEST: CPT

## 2021-08-10 PROCEDURE — 80053 COMPREHEN METABOLIC PANEL: CPT

## 2021-08-10 PROCEDURE — 74011000636 HC RX REV CODE- 636

## 2021-08-10 RX ORDER — POTASSIUM CHLORIDE AND SODIUM CHLORIDE 450; 150 MG/100ML; MG/100ML
INJECTION, SOLUTION INTRAVENOUS CONTINUOUS
Status: DISCONTINUED | OUTPATIENT
Start: 2021-08-10 | End: 2021-08-12

## 2021-08-10 RX ORDER — GUAIFENESIN 100 MG/5ML
81 LIQUID (ML) ORAL DAILY
Status: DISCONTINUED | OUTPATIENT
Start: 2021-08-11 | End: 2021-08-12 | Stop reason: HOSPADM

## 2021-08-10 RX ORDER — POTASSIUM CHLORIDE 20 MEQ/1
40 TABLET, EXTENDED RELEASE ORAL
Status: COMPLETED | OUTPATIENT
Start: 2021-08-10 | End: 2021-08-10

## 2021-08-10 RX ORDER — ENOXAPARIN SODIUM 100 MG/ML
1 INJECTION SUBCUTANEOUS ONCE
Status: COMPLETED | OUTPATIENT
Start: 2021-08-10 | End: 2021-08-10

## 2021-08-10 RX ORDER — METOPROLOL TARTRATE 25 MG/1
25 TABLET, FILM COATED ORAL EVERY 12 HOURS
Status: DISCONTINUED | OUTPATIENT
Start: 2021-08-10 | End: 2021-08-12 | Stop reason: HOSPADM

## 2021-08-10 RX ORDER — METOPROLOL TARTRATE 25 MG/1
25 TABLET, FILM COATED ORAL EVERY 12 HOURS
Status: DISCONTINUED | OUTPATIENT
Start: 2021-08-10 | End: 2021-08-10

## 2021-08-10 RX ORDER — VANCOMYCIN 1.75 GRAM/500 ML IN 0.9 % SODIUM CHLORIDE INTRAVENOUS
1750 ONCE
Status: COMPLETED | OUTPATIENT
Start: 2021-08-10 | End: 2021-08-10

## 2021-08-10 RX ADMIN — VANCOMYCIN HYDROCHLORIDE 1750 MG: 10 INJECTION, POWDER, LYOPHILIZED, FOR SOLUTION INTRAVENOUS at 20:22

## 2021-08-10 RX ADMIN — ACETAMINOPHEN 650 MG: 325 TABLET ORAL at 11:46

## 2021-08-10 RX ADMIN — METOPROLOL TARTRATE 25 MG: 25 TABLET, FILM COATED ORAL at 18:47

## 2021-08-10 RX ADMIN — Medication 10 ML: at 06:35

## 2021-08-10 RX ADMIN — SODIUM CHLORIDE 150 ML/HR: 9 INJECTION, SOLUTION INTRAVENOUS at 01:05

## 2021-08-10 RX ADMIN — POTASSIUM CHLORIDE AND SODIUM CHLORIDE: 450; 150 INJECTION, SOLUTION INTRAVENOUS at 20:11

## 2021-08-10 RX ADMIN — HEPARIN SODIUM 5000 UNITS: 5000 INJECTION INTRAVENOUS; SUBCUTANEOUS at 11:46

## 2021-08-10 RX ADMIN — POTASSIUM CHLORIDE 40 MEQ: 20 TABLET, EXTENDED RELEASE ORAL at 10:22

## 2021-08-10 RX ADMIN — Medication 10 ML: at 13:43

## 2021-08-10 RX ADMIN — ENOXAPARIN SODIUM 70 MG: 80 INJECTION SUBCUTANEOUS at 20:11

## 2021-08-10 RX ADMIN — HEPARIN SODIUM 5000 UNITS: 5000 INJECTION INTRAVENOUS; SUBCUTANEOUS at 01:05

## 2021-08-10 RX ADMIN — POTASSIUM CHLORIDE AND SODIUM CHLORIDE: 450; 150 INJECTION, SOLUTION INTRAVENOUS at 10:29

## 2021-08-10 RX ADMIN — IOPAMIDOL 100 ML: 755 INJECTION, SOLUTION INTRAVENOUS at 11:18

## 2021-08-10 RX ADMIN — Medication 10 ML: at 22:00

## 2021-08-10 RX ADMIN — CEFTRIAXONE SODIUM 2 G: 2 INJECTION, POWDER, FOR SOLUTION INTRAMUSCULAR; INTRAVENOUS at 20:11

## 2021-08-10 NOTE — PROGRESS NOTES
Transition of Care Plan:    RUR: 9% LOW  Disposition: Home w/ f/u appts  Follow up appointments: PCP (new-pt appt)  DME needed: None  Transportation at Discharge: [address] or means to access home:  Yes       Medicare Letter: N/A  Is patient a BCPI-A Bundle: No          If yes, was Bundle Letter given?:   N/A  Caregiver Contact: Daniel Snyder, [phone]  Discharge Caregiver contacted prior to discharge? No- pt declined for CM to contact family    CM reviewed pt's chart. CM met w/ pt at bedside to introduce role & complete assessment. Pt confirmed demographic information. 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 Independent w/ ADLs/ IADLs & drives. No use of DME/ home O2. No prior hx of rehab/ HH. No current needs identified. Pt has no PCP; agreeable to CM providing list of BS providers. Pt to drive himself at d/c. Will continue to follow & report updates. Reason for Admission:  Hypokalemia                    RUR Score:       9%              Plan for utilizing home health:      No needs    PCP: First and Last name:  None   Name of Practice:    Are you a current patient: Yes/No:    Approximate date of last visit:    Can you participate in a virtual visit with your PCP:                     Current Advanced Directive/Advance Care Plan: Full Code  Advance Care Planning     General Advance Care Planning (ACP) Conversation  Date of Conversation: 8/10/2021  Conducted with: Patient with Decision Making Capacity    Healthcare Decision Maker: Daniel Snyder, [phone]  Today we documented Decision Maker(s) consistent with Legal Next of Kin hierarchy. Content/Action Overview:    Has ACP document(s) on file - reflects the patient's care preferences  Reviewed DNR/DNI and patient elects Full Code (Attempt Resuscitation)    Length of Voluntary ACP Conversation in minutes:  <16 minutes (Non-Billable)    Daniel Barroso [address] Management Interventions  PCP Verified by CM: No (provided list of BS providers)  Palliative Care Criteria Met (RRAT>21 & CHF Dx)?: No  Mode of Transport at Discharge: Self  Transition of Care Consult (CM Consult): Discharge Planning  Current Support Network: Family Lives [facility] (currently resides at hotel for work)  Confirm Follow Up Transport: Self  The Plan for Transition of Care is Related to the Following Treatment Goals : Home w/ f/u appts  The Patient and/or Patient Representative was Provided with a Choice of Provider and Agrees with the Discharge Plan?: Yes  Discharge Location  Discharge Placement: Home with family assistance (& f/u appts)    RAFAEL Carmen  Care Management

## 2021-08-10 NOTE — PROGRESS NOTES
15:33- Heart rate 142 on tele. Patient resting in bed, no complaints. Troponin 2.02 on labs. [employer] sent to Dr. Neema Harper to notify. 15:44- Spoke to Dr. Neema Harper. Notified MD of lab results.

## 2021-08-10 NOTE — INTERDISCIPLINARY ROUNDS
Interdisciplinary Rounds were completed on 08/10/21 for this patient. Rounds included nursing, clinical care leader, pharmacy, and case management. Plan of care discussed. See clinical pathway and/or care plan for interventions and desired outcomes.

## 2021-08-10 NOTE — H&P
Hospitalist Admission Note    NAME: Sonya Estrada   :  2000   MRN:  [id number]     Date/Time:  2021 11:22 PM    Patient PCP: None  ______________________________________________________________________  Given the patient's current clinical presentation, I have a high level of concern for decompensation if discharged from the emergency department. Complex decision making was performed, which includes reviewing the patient's available past medical records, laboratory results, and x-ray films. My assessment of this patient's clinical condition and my plan of care is as follows. Assessment / Plan:  Anabolic steroid abuse  Severe hypokalemia  Tachycardia  Dehydration  PAZ  -Admit to telemetry  -Continue aggressive fluid hydration started in the ED  -Patient is afebrile, no leukocytosis  -Admit to injectable IV p.o. 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 Has been theorized that heavy carbohydrate loading continue endogenous insulin release and intracellular potassium shift. Could also be 2/2 diuretic use?   -Trend BMP  -K repleted IV and p.o.  -Patient counseled on misuse of controlled substances including steroids and other unregulated internet supplements and encouraged not to take unprescribed medicines  -monitor Cr, suspect he is dehydrated, possible from intentional fluid restriction, possible diuretic use  -tsh wnl        Code Status: Full  Surrogate Decision Maker:    DVT Prophylaxis:   GI Prophylaxis: not indicated    Baseline: , engaged in weightlifting and admiting regular use of anabolic steroids      Subjective:   CHIEF COMPLAINT: Nausea and vomiting    HISTORY OF PRESENT ILLNESS:     Belen Charles is a 69-year-old male with no known past medical history presenting to the ED with complaint of nausea and vomiting that began this morning. The patient is a  and admits to regular use of injectable and p.o. steroids prior to his workout regimens. He states he arose earlier this morning around 6:15 AM and took a supplement called \"anadrol\" and began to feel shaky, nauseous, and experience palpitations. He has never experienced similar sensations. He denies fevers or chills, denies CP, denies respiratory symps    We were asked to admit for work up and evaluation of the above problems. No past medical history on file. No past surgical history on file. Social History     Tobacco Use    Smoking status: Not on file   Substance Use Topics    Alcohol use: Not on file        No family history on file. No Known Allergies     Prior to Admission medications    Medication Sig Start Date End Date Taking? Authorizing Provider   OTHER Take 3 Capsules by mouth daily. eRelyx (from [employer])   Yes Provider, Historical       REVIEW OF SYSTEMS:     I am not able to complete the review of systems because:    The patient is intubated and sedated    The patient has altered mental status due to his acute medical problems    The patient has baseline aphasia from prior stroke(s)    The patient has baseline dementia and is not reliable historian    The patient is in acute medical distress and unable to provide information           Total of 12 systems reviewed as follows:       POSITIVE= underlined text  Negative = text not underlined  General:  fever, chills, sweats, generalized weakness, weight loss/gain,      loss of appetite   Eyes:    blurred vision, eye pain, loss of vision, double vision  ENT:    rhinorrhea, pharyngitis   Respiratory:   cough, sputum production, SOB, ULLOA, wheezing, pleuritic pain   Cardiology:   chest pain, palpitations, orthopnea, PND, edema, syncope   Gastrointestinal:  abdominal pain , N/V, diarrhea, dysphagia, constipation, bleeding   Genitourinary:  frequency, urgency, dysuria, hematuria, incontinence   Muskuloskeletal :  arthralgia, myalgia, back pain  Hematology:  easy bruising, nose or gum bleeding, lymphadenopathy   Dermatological: rash, ulceration, pruritis, color change / jaundice  Endocrine:   hot flashes or polydipsia   Neurological:  headache, dizziness, confusion, focal weakness, paresthesia,     Speech difficulties, memory loss, gait difficulty  Psychological: Feelings of anxiety, depression, agitation    Objective:   VITALS:    Visit Vitals  BP (!) 91/35   Pulse (!) 124   Temp 98.6 °F (37 °C)   Resp 18   Ht 5' 7\" (1.702 m)   Wt 74 kg (163 lb 2.3 oz)   SpO2 98%   BMI 25.55 kg/m²       PHYSICAL EXAM:    General:    Alert, muscular young adult male cooperative, no distress, appears stated age. HEENT: Atraumatic, anicteric sclerae, pink conjunctivae     No oral ulcers, mucosa moist, throat clear, dentition fair  Neck:  Supple, symmetrical,  thyroid: non tender  Lungs:   Clear to auscultation bilaterally. No Wheezing or Rhonchi. No rales. Chest wall:  No tenderness  No Accessory muscle use. Heart:   tachycardia,  No  murmur   No edema  Abdomen:   Soft, non-tender. Not distended. Bowel sounds normal  Extremities: No cyanosis. No clubbing,      Skin turgor normal, Capillary refill normal, Radial dial pulse 2+  Skin:     Not pale. Not Jaundiced  No rashes   Psych:  Not depressed. Not anxious or agitated. Neurologic: EOMs intact. No facial asymmetry. No aphasia or slurred speech. Symmetrical strength, Sensation grossly intact.  Alert and oriented X 4.     _______________________________________________________________________  Care Plan discussed with:    Comments   Patient x    Family      RN x    Care Manager                    Consultant:  graham ED MD   _______________________________________________________________________  Expected  Disposition:   Home with Family x   HH/PT/OT/RN    SNF/LTC    CHRISTOPHER    ________________________________________________________________________  TOTAL TIME:  79 Minutes    Critical Care Provided     Minutes non procedure based      Comments    x Reviewed previous records   >50% of visit spent in counseling and coordination of care x Discussion with patient and/or family and questions answered       ________________________________________________________________________  Signed: Honey Prior, DO    Procedures: see electronic medical records for all procedures/Xrays and details which were not copied into this note but were reviewed prior to creation of Plan.     LAB DATA REVIEWED:    Recent Results (from the past 24 hour(s))   EKG, 12 LEAD, INITIAL    Collection Time: 08/09/21  8:42 AM   Result Value Ref Range    Ventricular Rate 136 BPM    Atrial Rate 136 BPM    P-R Interval 134 ms    QRS Duration 100 ms    Q-T Interval 312 ms    QTC Calculation (Bezet) 469 ms    Calculated P Axis 80 degrees    Calculated R Axis 97 degrees    Calculated T Axis -9 degrees    Diagnosis       Sinus tachycardia  Normal except for the rate  No previous ECGs available  Recommend repeat  Confirmed by Darcia Spatz ([id number]) on 8/9/2021 4:36:03 PM     CBC WITH AUTOMATED DIFF    Collection Time: 08/09/21  8:59 AM   Result Value Ref Range    WBC 17.2 (H) 4.1 - 11.1 K/uL    RBC 5.47 4.10 - 5.70 M/uL    HGB 16.2 12.1 - 17.0 g/dL    HCT 48.1 36.6 - 50.3 %    MCV 87.9 80.0 - 99.0 FL    MCH 29.6 26.0 - 34.0 PG    MCHC 33.7 30.0 - 36.5 g/dL    RDW 13.4 11.5 - 14.5 %    PLATELET 226 068 - 287 K/uL    MPV 10.8 8.9 - 12.9 FL    NRBC 0.0 0  WBC    ABSOLUTE NRBC 0.00 0.00 - 0.01 K/uL    NEUTROPHILS 79 (H) 32 - 75 %    LYMPHOCYTES 11 (L) 12 - 49 %    MONOCYTES 8 5 - 13 %    EOSINOPHILS 0 0 - 7 %    BASOPHILS 1 0 - 1 %    IMMATURE GRANULOCYTES 1 (H) 0.0 - 0.5 %    ABS. NEUTROPHILS 13.8 (H) 1.8 - 8.0 K/UL    ABS. LYMPHOCYTES 1.9 0.8 - 3.5 K/UL    ABS. MONOCYTES 1.3 (H) 0.0 - 1.0 K/UL    ABS. EOSINOPHILS 0.0 0.0 - 0.4 K/UL    ABS. BASOPHILS 0.1 0.0 - 0.1 K/UL    ABS. IMM. GRANS. 0.1 (H) 0.00 - 0.04 K/UL    DF AUTOMATED     METABOLIC PANEL, COMPREHENSIVE    Collection Time: 08/09/21  8:59 AM   Result Value Ref Range    Sodium 142 136 - 145 mmol/L    Potassium 2.1 (LL) 3.5 - 5.1 mmol/L    Chloride 108 97 - 108 mmol/L    CO2 21 21 - 32 mmol/L    Anion gap 13 5 - 15 mmol/L    Glucose 275 (H) 65 - 100 mg/dL    BUN 20 6 - 20 MG/DL    Creatinine 1.61 (H) 0.70 - 1.30 MG/DL    BUN/Creatinine ratio 12 12 - 20      GFR est AA >60 >60 ml/min/1.73m2    GFR est non-AA 54 (L) >60 ml/min/1.73m2    Calcium 8.4 (L) 8.5 - 10.1 MG/DL    Bilirubin, total 0.3 0.2 - 1.0 MG/DL    ALT (SGPT) 34 12 - 78 U/L    AST (SGOT) 20 15 - 37 U/L    Alk.  phosphatase 110 45 - 117 U/L    Protein, total 6.7 6.4 - 8.2 g/dL    Albumin 3.8 3.5 - 5.0 g/dL    Globulin 2.9 2.0 - 4.0 g/dL    A-G Ratio 1.3 1.1 - 2.2     TROPONIN I    Collection Time: 08/09/21  8:59 AM   Result Value Ref Range    Troponin-I, Qt. <0.05 <0.05 ng/mL   TSH 3RD GENERATION    Collection Time: 08/09/21  8:59 AM   Result Value Ref Range    TSH 2.09 0.36 - 3.74 uIU/mL   EKG, 12 LEAD, SUBSEQUENT    Collection Time: 08/09/21  9:11 AM   Result Value Ref Range    Ventricular Rate 122 BPM    Atrial Rate 122 BPM    P-R Interval 128 ms    QRS Duration 104 ms    Q-T Interval 360 ms    QTC Calculation (Bezet) 513 ms    Calculated P Axis 68 degrees    Calculated R Axis 94 degrees    Calculated T Axis -17 degrees    Diagnosis       Sinus tachycardia  Normal except for the rate    Confirmed by Darcia Spatz ([id number]) on 8/9/2021 4:36:37 PM     DRUG SCREEN, URINE    Collection Time: 08/09/21  1:08 PM   Result Value Ref Range    AMPHETAMINES Negative NEG      BARBITURATES Negative NEG      BENZODIAZEPINES Negative NEG      COCAINE Negative NEG METHADONE Negative NEG      OPIATES Negative NEG      PCP(PHENCYCLIDINE) Negative NEG      THC (TH-CANNABINOL) Negative NEG      Drug screen comment (NOTE)

## 2021-08-10 NOTE — PROGRESS NOTES
Pharmacy Antimicrobial Kinetic Dosing    Indication for Antimicrobials: Sepsis     Current Regimen of Each Antimicrobial:  Vancomycin 1750 mg IV once, then 750 mg IV q8h  (Start Date 8/10; Day # 1)  Ceftriaxone 2 gm IV q24h (Start Date 8/10; Day # 1)    Previous Antimicrobial Therapy:    Goal Level: AUC: 400-600 mg/hr/Liter/day    Date Dose & Interval Measured (mcg/mL) Extrapolated (mcg/mL)                       Date & time of next level:     Significant Positive Cultures:   8/10 blood: pending    Conditions for Dosing Consideration: None    Labs:  Recent Labs     08/10/21  1514 08/10/21  0102 21  0859   CREA 0.97 1.15 1.61*   BUN 11 10 20     Recent Labs     08/10/21  1514 08/10/21  0102 21  0859   WBC 15.6* 14.3* 17.2*     Temp (24hrs), Av.3 °F (36.8 °C), Min:97.5 °F (36.4 °C), Max:99.3 °F (37.4 °C)    Creatinine Clearance (mL/min):   CrCl (Ideal Body Weight): 112.6   If actual weight < IBW: CrCl (Actual Body Weight) 126.1    Impression/Plan:   Will order vancomycin 1750 mg IV once, then 750 mg IV every 8 hours for an -600  Antimicrobial stop date TBD     Pharmacy will follow daily and adjust medications as appropriate for renal function and/or serum levels.     Thank you,  Nicolás Feldman, PHARMD

## 2021-08-10 NOTE — PROGRESS NOTES
End of Shift Note    Bedside shift change report given to GERRY Brooks (oncoming nurse) by Moira Templeton (offgoing nurse). Report included the following information SBAR, Kardex, Procedure Summary, Intake/Output, Recent Results and Cardiac Rhythm ST    Shift worked:  7a-7p     Shift summary and any significant changes:    Patient went for ECHO and CTA this am, see results. Lab work drawn at [facility] per MD orders, MD messaged with results. New orders placed. Blood cultures x 2 this shift. Patient to start on ASA in am, metoprolol 25mg started this evening. Patient has been asymptomatic with HR running 130-140's ST, resting in bed with  this evening. Concerns for physician to address:       Zone phone for oncoming shift:   [phone]       Activity:  Activity Level: Up ad lucas  Number times ambulated in hallways past shift: 0  Number of times OOB to chair past shift: 0    Cardiac:   Cardiac Monitoring: Yes      Cardiac Rhythm: Sinus Tach    Access:   Current line(s): PIV     Genitourinary:   Urinary status: voiding    Respiratory:   O2 Device: None (Room air)  Chronic home O2 use?: NO  Incentive spirometer at bedside: NO     GI:  Last Bowel Movement Date: 08/10/21  Current diet:  ADULT DIET Regular  Passing flatus: YES  Tolerating current diet: YES       Pain Management:   Patient states pain is manageable on current regimen: YES    Skin:  Chris Score: 23  Interventions: increase time out of bed    Patient Safety:  Fall Score:  Total Score: 0  Interventions: gripper socks       Length of Stay:  Expected LOS: - - -  Actual LOS: [address] [address] [address]

## 2021-08-10 NOTE — PROGRESS NOTES
Hospitalist Progress Note    NAME: Severo Howell   :  2000   MRN:  [id number]     Admit date: 2021    Today's date: 08/10/21    PCP: Lisa Sorensen M.D. Cell [phone]    Anticipated discharge date: 2021    Barriers:      Assessment / Plan:    Acute kidney injury POA Creat 1.61  SIRS criteria POA WBC 17.2, HR 130s, ? Source of infection  Sinus tachycardia POA  Severe hypokalemia K 2.1  Chest pain POA associated with SOB, worse with breathing  - Occurred in setting of anabolic steroid use  - IVF  - K improved to 3.6, additional PO and IV KCL  -Patient is afebrile, no leukocytosis  -Admit to injectable IV p.o. steroid supplements for his workouts  - ? diuretic abuse  -Significant hyperglycemia also noted on admission BMP with blood sugar of 275, no known history of diabetes  - Still tachycardiac  - CP still present, worse with breathing, mild SOB = check CTA chest  - Check echo  - Normal TSH  - check UA, BC and lactate; hold antibiotics for now  -Patient counseled on misuse of controlled substances including steroids and other unregulated internet supplements and encouraged not to take unprescribed medicines  - D/C pending repeat lab work, studies     Code Status: Full  Surrogate Decision Maker:     DVT Prophylaxis:   GI Prophylaxis: not indicated     Baseline: , engaged in weightlifting and admiting regular use of anabolic steroids    Subjective:     Chief Complaint / Reason for Physician Visit follow-up SIRS, acute kidney injury  \"Not throwing up anymore\". Discussed with RN events overnight.    Feels better  Still tachycardiac to 130s  Mild substernal chest discomfort, worse with taking a breath, mildly short of breath  Not currently on oxygen  No abdominal pain  Headache earlier today now resolved    Review of Systems:  Symptom Y/N Comments  Symptom Y/N Comments   Fever/Chills n   Chest Pain y    Poor Appetite    Edema     Cough n   Abdominal Pain n Sputum    Joint Pain     SOB/ULLOA y   Headache n    Nausea/vomit n   Tolerating PT/OT     Diarrhea n   Tolerating Diet y    Constipation    Other       Could NOT obtain due to:      Objective:     VITALS:   Last 24hrs VS reviewed since prior progress note. Most recent are:  Patient Vitals for the past 24 hrs:   Temp Pulse Resp BP SpO2   08/10/21 0851    (!) 108/36    08/10/21 0808 98.2 °F (36.8 °C) (!) 123 17  100 %   08/10/21 0400  (!) 104      08/10/21 0341 98.6 °F (37 °C) (!) 120 18 101/60    08/10/21 0000 98.1 °F (36.7 °C) (!) 116 18 (!) 95/54    08/09/21 2000  (!) 124      08/09/21 1954 97.5 °F (36.4 °C) (!) 124 18 (!) 91/35 98 %   08/09/21 1600  (!) 130      08/09/21 1500 98.6 °F (37 °C) (!) 131 18 (!) 128/46 98 %   08/09/21 1400 98.6 °F (37 °C) (!) 140 24 (!) 120/37 99 %   08/09/21 1230  (!) 135 26 (!) 100/32 99 %   08/09/21 1130  (!) 140 28  100 %   08/09/21 1125  (!) 136  (!) 96/32    08/09/21 1015  (!) 127 25 (!) 102/34 100 %       Intake/Output Summary (Last 24 hours) at 8/10/2021 0954  Last data filed at 8/9/2021 1132  Gross per 24 hour   Intake 2150 ml   Output    Net 2150 ml        Wt Readings from Last 12 Encounters:   08/09/21 74 kg (163 lb 2.3 oz)       PHYSICAL EXAM:    I had a face to face encounter and independently examined this patient on 08/10/21 as outlined below:    General: WD, WN. Alert, cooperative, no acute distress    EENT:  PERRL. Anicteric sclerae. MMM  Resp:  CTA bilaterally, no wheezing or rales. No accessory muscle use  CV:  Regular  rhythm,  No edema  GI:  Soft, Non distended, Non tender. +Bowel sounds, no rebound  Neurologic:  Alert and oriented X 3, normal speech, non focal motor exam  Psych:   Good insight. Not anxious nor agitated  Skin:  No rashes.   No jaundice    Reviewed most current lab test results and cultures  YES  Reviewed most current radiology test results   YES  Review and summation of old records today    NO  Reviewed patient's current orders and MAR    YES  PMH/SH reviewed - no change compared to H&P  ________________________________________________________________________  Care Plan discussed with:    Comments   Patient x    Family      RN x    Care Manager     Consultant                        Multidiciplinary team rounds were held today with , nursing, pharmacist and clinical coordinator. Patient's plan of care was discussed; medications were reviewed and discharge planning was addressed. ________________________________________________________________________      Comments   >50% of visit spent in counseling and coordination of care     ________________________________________________________________________  Massiel De Souza MD     Procedures: see electronic medical records for all procedures/Xrays and details which were not copied into this note but were reviewed prior to creation of Plan. LABS:  I reviewed today's most current labs and imaging studies.   Pertinent labs include:  Recent Labs     08/10/21  0102 08/09/21  0859   WBC 14.3* 17.2*   HGB 13.2 16.2   HCT 40.7 48.1    239     Recent Labs     08/10/21  0102 08/09/21  0859    142   K 3.6 2.1*   * 108   CO2 20* 21   * 275*   BUN 10 20   CREA 1.15 1.61*   CA 8.0* 8.4*   ALB  --  3.8   TBILI  --  0.3   ALT  --  34

## 2021-08-10 NOTE — PROGRESS NOTES
Hospitalist    Repeat troponin elevated at 2.02  CPK 1111  Procalcitonin 0.87  Urinalysis negative  Paired blood cultures in lab  Urine drug screen negative  Potassium improved to 3.8    Etiology of elevated troponin unclear  Discussed case with Dr. Manuela Cunningham, he will see in consultation in a.m.   Aspirin and low-dose Lopressor  Lovenox 1 mg/kg x 1  N.p.o. after midnight in case any further procedures needed with Dr. Michael Champion  Empiric ceftriaxone and vancomycin pending the blood cultures    Veronica Alvarez MD

## 2021-08-10 NOTE — PROGRESS NOTES
Per Arthur Hughes in Transport, Patient's IV pump with Potassium running, got disconnected from the pump. Arthur Hughes had an ER Nurse (Ana Cristina Klein) call floor, no answer, so she disconnected the pump.

## 2021-08-11 LAB
ANION GAP SERPL CALC-SCNC: 6 MMOL/L (ref 5–15)
BASOPHILS # BLD: 0.1 K/UL (ref 0–0.1)
BASOPHILS NFR BLD: 1 % (ref 0–1)
BUN SERPL-MCNC: 10 MG/DL (ref 6–20)
BUN/CREAT SERPL: 10 (ref 12–20)
CALCIUM SERPL-MCNC: 8 MG/DL (ref 8.5–10.1)
CHLORIDE SERPL-SCNC: 112 MMOL/L (ref 97–108)
CHOLEST SERPL-MCNC: 104 MG/DL
CO2 SERPL-SCNC: 21 MMOL/L (ref 21–32)
CREAT SERPL-MCNC: 0.99 MG/DL (ref 0.7–1.3)
DIFFERENTIAL METHOD BLD: ABNORMAL
EOSINOPHIL # BLD: 0.1 K/UL (ref 0–0.4)
EOSINOPHIL NFR BLD: 1 % (ref 0–7)
ERYTHROCYTE [DISTWIDTH] IN BLOOD BY AUTOMATED COUNT: 14.2 % (ref 11.5–14.5)
EST. AVERAGE GLUCOSE BLD GHB EST-MCNC: 97 MG/DL
GLUCOSE SERPL-MCNC: 90 MG/DL (ref 65–100)
HBA1C MFR BLD: 5 % (ref 4–5.6)
HCT VFR BLD AUTO: 44.5 % (ref 36.6–50.3)
HDLC SERPL-MCNC: 44 MG/DL
HDLC SERPL: 2.4 {RATIO} (ref 0–5)
HGB BLD-MCNC: 14.8 G/DL (ref 12.1–17)
IMM GRANULOCYTES # BLD AUTO: 0 K/UL (ref 0–0.04)
IMM GRANULOCYTES NFR BLD AUTO: 0 % (ref 0–0.5)
LDLC SERPL CALC-MCNC: 49.4 MG/DL (ref 0–100)
LYMPHOCYTES # BLD: 2.2 K/UL (ref 0.8–3.5)
LYMPHOCYTES NFR BLD: 19 % (ref 12–49)
MCH RBC QN AUTO: 29.5 PG (ref 26–34)
MCHC RBC AUTO-ENTMCNC: 33.3 G/DL (ref 30–36.5)
MCV RBC AUTO: 88.8 FL (ref 80–99)
MONOCYTES # BLD: 0.9 K/UL (ref 0–1)
MONOCYTES NFR BLD: 8 % (ref 5–13)
NEUTS SEG # BLD: 8.1 K/UL (ref 1.8–8)
NEUTS SEG NFR BLD: 71 % (ref 32–75)
NRBC # BLD: 0 K/UL (ref 0–0.01)
NRBC BLD-RTO: 0 PER 100 WBC
PLATELET # BLD AUTO: 201 K/UL (ref 150–400)
PMV BLD AUTO: 10.1 FL (ref 8.9–12.9)
POTASSIUM SERPL-SCNC: 4.6 MMOL/L (ref 3.5–5.1)
RBC # BLD AUTO: 5.01 M/UL (ref 4.1–5.7)
SODIUM SERPL-SCNC: 139 MMOL/L (ref 136–145)
TRIGL SERPL-MCNC: 53 MG/DL (ref ?–150)
TROPONIN I SERPL-MCNC: 1.25 NG/ML
VLDLC SERPL CALC-MCNC: 10.6 MG/DL
WBC # BLD AUTO: 11.3 K/UL (ref 4.1–11.1)

## 2021-08-11 PROCEDURE — 94760 N-INVAS EAR/PLS OXIMETRY 1: CPT

## 2021-08-11 PROCEDURE — 74011250636 HC RX REV CODE- 250/636: Performed by: INTERNAL MEDICINE

## 2021-08-11 PROCEDURE — C1769 GUIDE WIRE: HCPCS | Performed by: INTERNAL MEDICINE

## 2021-08-11 PROCEDURE — 77030019698 HC SYR ANGI MDLON MRTM -A: Performed by: INTERNAL MEDICINE

## 2021-08-11 PROCEDURE — 36415 COLL VENOUS BLD VENIPUNCTURE: CPT

## 2021-08-11 PROCEDURE — 77030019569 HC BND COMPR RAD TERU -B: Performed by: INTERNAL MEDICINE

## 2021-08-11 PROCEDURE — 77030008543 HC TBNG MON PRSS MRTM -A: Performed by: INTERNAL MEDICINE

## 2021-08-11 PROCEDURE — 74011250637 HC RX REV CODE- 250/637: Performed by: INTERNAL MEDICINE

## 2021-08-11 PROCEDURE — 74011000636 HC RX REV CODE- 636: Performed by: INTERNAL MEDICINE

## 2021-08-11 PROCEDURE — 80048 BASIC METABOLIC PNL TOTAL CA: CPT

## 2021-08-11 PROCEDURE — 2709999900 HC NON-CHARGEABLE SUPPLY

## 2021-08-11 PROCEDURE — 4A023N7 MEASUREMENT OF CARDIAC SAMPLING AND PRESSURE, LEFT HEART, PERCUTANEOUS APPROACH: ICD-10-PCS | Performed by: INTERNAL MEDICINE

## 2021-08-11 PROCEDURE — 77030010221 HC SPLNT WR POS TELE -B: Performed by: INTERNAL MEDICINE

## 2021-08-11 PROCEDURE — 84484 ASSAY OF TROPONIN QUANT: CPT

## 2021-08-11 PROCEDURE — 76937 US GUIDE VASCULAR ACCESS: CPT | Performed by: INTERNAL MEDICINE

## 2021-08-11 PROCEDURE — 74011000250 HC RX REV CODE- 250: Performed by: INTERNAL MEDICINE

## 2021-08-11 PROCEDURE — 65660000000 HC RM CCU STEPDOWN

## 2021-08-11 PROCEDURE — 99152 MOD SED SAME PHYS/QHP 5/>YRS: CPT | Performed by: INTERNAL MEDICINE

## 2021-08-11 PROCEDURE — 93458 L HRT ARTERY/VENTRICLE ANGIO: CPT | Performed by: INTERNAL MEDICINE

## 2021-08-11 PROCEDURE — 74011000258 HC RX REV CODE- 258: Performed by: INTERNAL MEDICINE

## 2021-08-11 PROCEDURE — B2111ZZ FLUOROSCOPY OF MULTIPLE CORONARY ARTERIES USING LOW OSMOLAR CONTRAST: ICD-10-PCS | Performed by: INTERNAL MEDICINE

## 2021-08-11 PROCEDURE — 85025 COMPLETE CBC W/AUTO DIFF WBC: CPT

## 2021-08-11 PROCEDURE — 80061 LIPID PANEL: CPT

## 2021-08-11 PROCEDURE — 65270000029 HC RM PRIVATE

## 2021-08-11 PROCEDURE — C1894 INTRO/SHEATH, NON-LASER: HCPCS | Performed by: INTERNAL MEDICINE

## 2021-08-11 PROCEDURE — 77030015766: Performed by: INTERNAL MEDICINE

## 2021-08-11 RX ORDER — SODIUM CHLORIDE 0.9 % (FLUSH) 0.9 %
5-40 SYRINGE (ML) INJECTION EVERY 8 HOURS
Status: DISCONTINUED | OUTPATIENT
Start: 2021-08-11 | End: 2021-08-12 | Stop reason: HOSPADM

## 2021-08-11 RX ORDER — SODIUM CHLORIDE 0.9 % (FLUSH) 0.9 %
5-40 SYRINGE (ML) INJECTION AS NEEDED
Status: DISCONTINUED | OUTPATIENT
Start: 2021-08-11 | End: 2021-08-12 | Stop reason: HOSPADM

## 2021-08-11 RX ORDER — HEPARIN SODIUM 1000 [USP'U]/ML
INJECTION, SOLUTION INTRAVENOUS; SUBCUTANEOUS AS NEEDED
Status: DISCONTINUED | OUTPATIENT
Start: 2021-08-11 | End: 2021-08-11 | Stop reason: HOSPADM

## 2021-08-11 RX ORDER — FENTANYL CITRATE 50 UG/ML
INJECTION, SOLUTION INTRAMUSCULAR; INTRAVENOUS AS NEEDED
Status: DISCONTINUED | OUTPATIENT
Start: 2021-08-11 | End: 2021-08-11 | Stop reason: HOSPADM

## 2021-08-11 RX ORDER — HEPARIN SODIUM 200 [USP'U]/100ML
INJECTION, SOLUTION INTRAVENOUS
Status: COMPLETED | OUTPATIENT
Start: 2021-08-11 | End: 2021-08-11

## 2021-08-11 RX ORDER — VERAPAMIL HYDROCHLORIDE 2.5 MG/ML
INJECTION, SOLUTION INTRAVENOUS AS NEEDED
Status: DISCONTINUED | OUTPATIENT
Start: 2021-08-11 | End: 2021-08-11 | Stop reason: HOSPADM

## 2021-08-11 RX ORDER — ATORVASTATIN CALCIUM 20 MG/1
20 TABLET, FILM COATED ORAL
Status: DISCONTINUED | OUTPATIENT
Start: 2021-08-11 | End: 2021-08-12 | Stop reason: HOSPADM

## 2021-08-11 RX ORDER — MIDAZOLAM HYDROCHLORIDE 1 MG/ML
INJECTION, SOLUTION INTRAMUSCULAR; INTRAVENOUS AS NEEDED
Status: DISCONTINUED | OUTPATIENT
Start: 2021-08-11 | End: 2021-08-11 | Stop reason: HOSPADM

## 2021-08-11 RX ORDER — GUAIFENESIN 100 MG/5ML
LIQUID (ML) ORAL AS NEEDED
Status: DISCONTINUED | OUTPATIENT
Start: 2021-08-11 | End: 2021-08-11 | Stop reason: HOSPADM

## 2021-08-11 RX ORDER — LIDOCAINE HYDROCHLORIDE 10 MG/ML
INJECTION, SOLUTION EPIDURAL; INFILTRATION; INTRACAUDAL; PERINEURAL AS NEEDED
Status: DISCONTINUED | OUTPATIENT
Start: 2021-08-11 | End: 2021-08-11 | Stop reason: HOSPADM

## 2021-08-11 RX ADMIN — VANCOMYCIN HYDROCHLORIDE 750 MG: 750 INJECTION, POWDER, LYOPHILIZED, FOR SOLUTION INTRAVENOUS at 20:15

## 2021-08-11 RX ADMIN — ATORVASTATIN CALCIUM 20 MG: 20 TABLET, FILM COATED ORAL at 21:24

## 2021-08-11 RX ADMIN — Medication 10 ML: at 05:04

## 2021-08-11 RX ADMIN — CEFTRIAXONE SODIUM 2 G: 2 INJECTION, POWDER, FOR SOLUTION INTRAMUSCULAR; INTRAVENOUS at 21:20

## 2021-08-11 RX ADMIN — POTASSIUM CHLORIDE AND SODIUM CHLORIDE: 450; 150 INJECTION, SOLUTION INTRAVENOUS at 10:42

## 2021-08-11 RX ADMIN — Medication 10 ML: at 12:48

## 2021-08-11 RX ADMIN — METOPROLOL TARTRATE 25 MG: 25 TABLET, FILM COATED ORAL at 21:24

## 2021-08-11 RX ADMIN — POTASSIUM CHLORIDE AND SODIUM CHLORIDE: 450; 150 INJECTION, SOLUTION INTRAVENOUS at 20:17

## 2021-08-11 RX ADMIN — METOPROLOL TARTRATE 25 MG: 25 TABLET, FILM COATED ORAL at 09:31

## 2021-08-11 RX ADMIN — Medication 10 ML: at 13:55

## 2021-08-11 RX ADMIN — Medication 10 ML: at 21:21

## 2021-08-11 RX ADMIN — VANCOMYCIN HYDROCHLORIDE 750 MG: 750 INJECTION, POWDER, LYOPHILIZED, FOR SOLUTION INTRAVENOUS at 04:00

## 2021-08-11 RX ADMIN — VANCOMYCIN HYDROCHLORIDE 750 MG: 750 INJECTION, POWDER, LYOPHILIZED, FOR SOLUTION INTRAVENOUS at 12:47

## 2021-08-11 NOTE — PROGRESS NOTES
Brief Procedure Note    Patient: Cindy Douglass MRN: [id number]  SSN: [id number]    YOB: 2000  Age: 23 y.o. Sex: male      Date of Procedure: 8/11/2021     Pre-procedure Diagnosis: NSTEMI    Post-procedure Diagnosis: No CAD, nml LVEDP    Procedure: Ultrasound-guided vascular access, LHC, cors    Performed By: Aliyah Guzman III, DO     Anesthesia: Moderate Sedation    Estimated Blood Loss: Less than 10 mL      Specimens:  None    Findings: as above    Complications: None    Implants: None    Recommendations: Continue medical therapy. Cardiac MRI.     Signed By: Bert Schwab DO     August 11, 2021

## 2021-08-11 NOTE — ROUTINE PROCESS
0845- Pt in post cath. Right radial site TR band WDL. Denies CP SOB. 1100- Right radial cath site CDI.  VSS. Denies CP SOB. IVF infusing as ordered. 1440- No C/O CP SOB.  VSS. SR/ST on monitor. Family at bedside. 1830- Pt cont to deny CP SOB. Right radial cath site CDI.  VSS.

## 2021-08-11 NOTE — PROGRESS NOTES
Transition of Care Plan:     RUR: 9% - low risk  Disposition: Home  Follow up appointments: PCP, Cardiology  DME needed: No needs identified at this time. Transportation at Discharge: father  Inés Devin or means to access home:  Yes      IM Medicare Letter: N/A  Is patient a BCPI-A Bundle: No                     If yes, was Bundle Letter given?:   N/A  Caregiver Contact:Rina Goel, mother, [phone]  Discharge Caregiver contacted prior to discharge? No- pt declined for CM to contact family. Initial Note 3:15 pm: CM at bedside to discuss discharge planning. Pt states that he sees Aayush Joaquin at [facility] (for his PCP). Also, his father will be transporting him home tomorrow. CM contacted [facility] and scheduled a PCP follow up appt. Appointment placed in CM one stop. Initial Note 10:00 am:  Pt is not medically stable for discharge. Will need cardiac MRI. Unit CM will continue to follow.      Marylen Purl, RN, BSN, [address] [place] [address] Care Manager  [phone]

## 2021-08-11 NOTE — PROGRESS NOTES
Bedside shift change report given to Dayanna Briseno (oncoming nurse) by Cecilia (offgoing nurse). Report included the following information SBAR, Kardex, Intake/Output, MAR and Recent Results.

## 2021-08-11 NOTE — PROGRESS NOTES
Hospitalist Progress Note    NAME: Pa Ribeiro   :  2000   MRN:  [id number]     Admit date: 2021    Today's date: 21    PCP: Lisa Cole M.D. Cell [phone]    Anticipated discharge date: 2021    Barriers:  cath today, pending cardiac mRI    Assessment / Plan:    SIRS POA WBC 17.2, HR 130s, normal lactate  Sinus tachycardia POA  Rhabdomyolysis POA CPK 1,111  Elevated troponin 2.02 POA  Chest pain POA associated with SOB, worse with breathing  - Occurred in setting of anabolic steroid and testosterone use  - Low K supplemented and resolved  - Patient is afebrile, no leukocytosis, unclear if infection present        No clear ASD on imaging        UA negative        Blood cultures pre -antibiotics are pending.  Negative so far  - Procalcitonin 0.97, started on empiric ceftriaxone and vanco   Will wean if BC remain negative  - Significant hyperglycemia also noted on admission BMP with blood sugar of 275, no known history of diabetes            HgBa1c was 5.0, suspect stressresponse  - Tachycardia improved with IVF, lopressor  - CTA chest with no ASD, dissection or PE  - Echo LVEF 60-65%, normal cavity size, trivial pericardial effusion, normal valves  - Normal TSH  - initial troponin was < 0.05, then bumped to 2.02  -  LDL 49.6  - IVF, serial CPKs, ? rhabdo related to steroids or hypokalemia  - Appreciate Dr Marques Collins input, cardiac cath with normal LVEDP, normal coronaries  - MRI cardiac to assess for myocarditis  - if work up negative, hopefully home in Am with outpatient follow up  - Spoke with patient at length about need to stop the steroids and testosterone use    Acute kidney injury POA Creat 1.61 now resolved, creatinine 0.99  Severe hypokalemia K 2.1 POA resoved  IVF, K supplemented, now improved  Serial labs    Code Status: Full  Surrogate Decision Maker:     DVT Prophylaxis:   GI Prophylaxis: not indicated     Baseline: , engaged in weightlifting and admiting regular use of anabolic steroids    Subjective:     Chief Complaint / Reason for Physician Visit follow-up SIRS, acute kidney injury  \"No problems\". Discussed with RN events overnight. HR improved  S/p cardiac cath for CP, elevated troponin was negative for CAD, normal LVEDP  No substernal CP or SOB    Review of Systems:  Symptom Y/N Comments  Symptom Y/N Comments   Fever/Chills n   Chest Pain n    Poor Appetite    Edema     Cough n   Abdominal Pain n    Sputum    Joint Pain     SOB/ULLOA y   Headache n    Nausea/vomit n   Tolerating PT/OT     Diarrhea n   Tolerating Diet y    Constipation    Other       Could NOT obtain due to:      Objective:     VITALS:   Last 24hrs VS reviewed since prior progress note. Most recent are:  Patient Vitals for the past 24 hrs:   Temp Pulse Resp BP SpO2   08/11/21 0816 97.9 °F (36.6 °C) 85 18 (!) 113/57 98 %   08/11/21 0423 97.7 °F (36.5 °C) 91 17 (!) 97/58 98 %   08/10/21 2358 97.9 °F (36.6 °C) (!) 116 17 107/64 98 %   08/10/21 1952 99.4 °F (37.4 °C) (!) 116 17 (!) 118/45 98 %   08/10/21 1522 99.3 °F (37.4 °C) (!) 114 17 (!) 111/46 99 %   08/10/21 1142 98.3 °F (36.8 °C) (!) 124 16 (!) 113/52 100 %       Intake/Output Summary (Last 24 hours) at 8/11/2021 0827  Last data filed at 8/11/2021 0442  Gross per 24 hour   Intake 2000 ml   Output    Net 2000 ml        Wt Readings from Last 12 Encounters:   08/09/21 74 kg (163 lb 2.3 oz)       PHYSICAL EXAM:    I had a face to face encounter and independently examined this patient on 08/11/21 as outlined below:    General: WD, WN. Alert, cooperative, no acute distress    EENT:  PERRL. Anicteric sclerae. MMM  Resp:  CTA bilaterally, no wheezing or rales. No accessory muscle use  CV:  Regular  rhythm,  No edema  GI:  Soft, Non distended, Non tender. +Bowel sounds, no rebound  Neurologic:  Alert and oriented X 3, normal speech, non focal motor exam  Psych:   Good insight. Not anxious nor agitated  Skin:  No rashes. No jaundice    Reviewed most current lab test results and cultures  YES  Reviewed most current radiology test results   YES  Review and summation of old records today    NO  Reviewed patient's current orders and MAR    YES  PMH/SH reviewed - no change compared to H&P  ________________________________________________________________________  Care Plan discussed with:    Comments   Patient x    Family      RN x    Care Manager     Consultant                        Multidiciplinary team rounds were held today with , nursing, pharmacist and clinical coordinator. Patient's plan of care was discussed; medications were reviewed and discharge planning was addressed. ________________________________________________________________________      Comments   >50% of visit spent in counseling and coordination of care     ________________________________________________________________________  Masseil De Souza MD     Procedures: see electronic medical records for all procedures/Xrays and details which were not copied into this note but were reviewed prior to creation of Plan. LABS:  I reviewed today's most current labs and imaging studies. Pertinent labs include:  Recent Labs     08/11/21  0322 08/10/21  1514 08/10/21  0102   WBC 11.3* 15.6* 14.3*   HGB 14.8 14.9 13.2   HCT 44.5 44.8 40.7    224 215     Recent Labs     08/11/21  0322 08/10/21  1514 08/10/21  0102 08/09/21  0859 08/09/21  0859    140 140   < > 142   K 4.6 3.8 3.6   < > 2.1*   * 111* 112*   < > 108   CO2 21 23 20*   < > 21   GLU 90 94 117*   < > 275*   BUN 10 11 10   < > 20   CREA 0.99 0.97 1.15   < > 1.61*   CA 8.0* 7.8* 8.0*   < > 8.4*   MG  --  2.4  --   --   --    ALB  --  3.0*  --   --  3.8   TBILI  --  0.8  --   --  0.3   ALT  --  31  --   --  34    < > = values in this interval not displayed.

## 2021-08-11 NOTE — CONSULTS
Consult    NAME: Ibis Godfrey   :  2000   MRN:  [id number]     Date/Time:  2021 7:23 AM    Patient PCP: None  ________________________________________________________________________     Assessment:     1. NSTEMI  2. Chest pain  3. Hypokalemia; resolved  4. Sinus tachycardia  5. Leukocytosis  6. Trying to be a professional  (oral and injectable anabolic steroids for the last two years). Rare etoh. No tobacco/etoh. 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 Plan: TnI to 2  CK 1111  CTA neg for PE  Echo w/ normal LVEF, no sig valve disease  TSH normal  UDS negative    WBC 17; trending down    CP has improved. 1. Got lovenox and ASA last night  2. Long discussion with the patient and his father. 3. Need to exclude SCAD. There are case reports of SCAD in young people with anabolic steroid use. 4. I have recommended diagnostic cath for definitive evaluation of the patient's coronary anatomy and PCI if appropriate. All risks, benefits, and alternatives were discussed and the patient wishes to proceed. 5. NPO  6. If cath is negative will order cardiac MRI to exclude myopericarditis. 7. May be rhabdo w/ all of his workout regimen, etc.  8. Continue ASA 81mg  9. Continue metoprolol 25mg q12h  10. Add statin  11. Iff all above cardiac testing is negative would place on 30 day EVR    Thank you for this consult and allowing me to take part in this patients care. Please call with questions. [x]        High complexity decision making was performed        Subjective:   CHIEF COMPLAINT: CP    HISTORY OF PRESENT ILLNESS:     Regan Grayson is a 23 y.o.  male who is \"presenting to the ED with complaint of nausea and vomiting that began this morning. The patient is a  and admits to regular use of injectable and p.o. steroids prior to his workout regimens.  He states he arose earlier this morning around 6:15 AM and took a supplement called \"anadrol\" and began to feel shaky, nauseous, and experience palpitations. He has never experienced similar sensations. He denies fevers or chills, denies CP, denies respiratory symps\"      We were asked to consult for work up and evaluation of the above problems. No past medical history on file. No past surgical history on file. No Known Allergies   Meds:  See below  Social History     Tobacco Use    Smoking status: Not on file   Substance Use Topics    Alcohol use: Not on file      No family history on file. REVIEW OF SYSTEMS:     []         Unable to obtain  ROS due to ---   [x]         Total of 12 systems reviewed as follows:    Constitutional: negative fever, negative chills, negative weight loss  Eyes:   negative visual changes  ENT:   negative sore throat, tongue or lip swelling  Respiratory:  negative cough, negative dyspnea  Cards:  negative for chest pain, palpitations, lower extremity edema  GI:   negative for nausea, vomiting, diarrhea, and abdominal pain  Genitourinary: negative for frequency, dysuria  Integument:  negative for rash   Hematologic:  negative for easy bruising and gum/nose bleeding  Musculoskel: negative for myalgias,  back pain  Neurological:  negative for headaches, dizziness, vertigo, weakness  Behavl/Psych: negative for feelings of anxiety, depression     Pertinent Positives include :    Objective:      Physical Exam:    Last 24hrs VS reviewed since prior progress note. Most recent are:    Visit Vitals  BP (!) 97/58   Pulse 91   Temp 97.7 °F (36.5 °C)   Resp 17   Ht 5' 7\" (1.702 m)   Wt 74 kg (163 lb 2.3 oz)   SpO2 98%   BMI 25.55 kg/m²       Intake/Output Summary (Last 24 hours) at 8/11/2021 0723  Last data filed at 8/11/2021 0442  Gross per 24 hour   Intake 2000 ml   Output    Net 2000 ml        General Appearance: Well developed, well nourished, alert & oriented x 3,    no acute distress.   Ears/Nose/Mouth/Throat: Pupils equal and round, Hearing grossly normal.  Neck: Supple. JVP within normal limits. Carotids good upstrokes, with no bruit. Chest: Lungs clear to auscultation bilaterally. Cardiovascular: Regular rate and rhythm, S1S2 normal, no murmur, rubs, gallops. Abdomen: Soft, non-tender, bowel sounds are active. No organomegaly. Extremities: No edema bilaterally. Femoral pulses +2, Distal Pulses +1. Skin: Warm and dry. Neuro: CN II-XII grossly intact, Strength and sensation grossly intact. []         Post-cath site without hematoma, bruit, tenderness, or thrill. Distal pulses intact. Data:      Telemetry:  SR    EKG:  NSR  []  No new EKG for review. Prior to Admission medications    Medication Sig Start Date End Date Taking? Authorizing Provider   OTHER Take 3 Capsules by mouth daily.  GROU.PS (from [employer])   Yes Provider, Historical       Recent Results (from the past 24 hour(s))   ECHO ADULT COMPLETE    Collection Time: 08/10/21 10:28 AM   Result Value Ref Range    IVSd 1.11 (A) 0.60 - 1.00 cm    IVSs 1.53 cm    LVIDd 4.55 4.20 - 5.90 cm    LVIDs 3.06 cm    LVOT d 2.06 cm    LVPWd 1.08 (A) 0.60 - 1.00 cm    LVPWs 1.70 cm    LVOT Peak Gradient 7.53 mmHg    LVOT Peak Velocity 137.25 cm/s    RVIDd 3.36 cm    Left Atrium Major Axis 4.07 cm    LA Volume 56.63 18.0 - 58.0 mL    LA Area 4C 17.52 cm2    LA Vol 2C 64.89 (A) 18.00 - 58.00 mL    LA Vol 4C 42.30 18.00 - 58.00 mL    Left Atrium to Aortic Root Ratio 1.13     Left Atrium to Aortic Root Ratio 1.13     AV Cusp 1.60 cm    Aortic Valve Area by Continuity of Peak Velocity 2.39 cm2    AoV PG 14.76 mmHg    Aortic Valve Systolic Peak Velocity 515.12 cm/s    MV A Fredo 93.08 cm/s    Mitral Valve E Wave Deceleration Time 145.59 ms    MV E Fredo 120.96 cm/s    E/E' ratio (averaged) 7.78     E/E' lateral 8.01     E/E' septal 7.55     LV E' Lateral Velocity 15.10 cm/s    LV E' Septal Velocity 16.03 cm/s    Pulmonic Valve Systolic Peak Instantaneous Gradient 7.82 mmHg Pulmonic Valve Max Velocity 139.68 cm/s    MV E/A 1.30     LV Mass .0 88.0 - 224.0 g    LV Mass AL Index 95.7 49.0 - 115.0 g/m2    Left Atrium Minor Axis 2.20 cm    LA Vol Index 30.61 16.00 - 28.00 ml/m2    LA Vol Index 35.08 16.00 - 28.00 ml/m2    LA Vol Index 22.86 16.00 - 28.00 ml/m2    RAYMON/BSA Pk Fredo 1.3 cm2/m2   LACTIC ACID    Collection Time: 08/10/21  3:03 PM   Result Value Ref Range    Lactic acid 1.4 0.4 - 2.0 MMOL/L   HEMOGLOBIN A1C WITH EAG    Collection Time: 08/10/21  3:10 PM   Result Value Ref Range    Hemoglobin A1c 5.0 4.0 - 5.6 %    Est. average glucose 97 mg/dL   URINALYSIS W/ REFLEX CULTURE    Collection Time: 08/10/21  3:14 PM    Specimen: Urine   Result Value Ref Range    Color YELLOW/STRAW      Appearance CLEAR CLEAR      Specific gravity 1.026 1.003 - 1.030      pH (UA) 7.0 5.0 - 8.0      Protein Negative NEG mg/dL    Glucose Negative NEG mg/dL    Ketone Negative NEG mg/dL    Bilirubin Negative NEG      Blood Negative NEG      Urobilinogen 1.0 0.2 - 1.0 EU/dL    Nitrites Negative NEG      Leukocyte Esterase Negative NEG      WBC 0-4 0 - 4 /hpf    RBC 0-5 0 - 5 /hpf    Epithelial cells FEW FEW /lpf    Bacteria Negative NEG /hpf    UA:UC IF INDICATED CULTURE NOT INDICATED BY UA RESULT CNI      Hyaline cast 0-2 0 - 5 /lpf   CBC WITH AUTOMATED DIFF    Collection Time: 08/10/21  3:14 PM   Result Value Ref Range    WBC 15.6 (H) 4.1 - 11.1 K/uL    RBC 5.12 4.10 - 5.70 M/uL    HGB 14.9 12.1 - 17.0 g/dL    HCT 44.8 36.6 - 50.3 %    MCV 87.5 80.0 - 99.0 FL    MCH 29.1 26.0 - 34.0 PG    MCHC 33.3 30.0 - 36.5 g/dL    RDW 14.0 11.5 - 14.5 %    PLATELET 580 258 - 195 K/uL    MPV 10.2 8.9 - 12.9 FL    NRBC 0.0 0  WBC    ABSOLUTE NRBC 0.00 0.00 - 0.01 K/uL    NEUTROPHILS 79 (H) 32 - 75 %    LYMPHOCYTES 14 12 - 49 %    MONOCYTES 6 5 - 13 %    EOSINOPHILS 0 0 - 7 %    BASOPHILS 1 0 - 1 %    IMMATURE GRANULOCYTES 0 0.0 - 0.5 %    ABS. NEUTROPHILS 12.3 (H) 1.8 - 8.0 K/UL    ABS.  LYMPHOCYTES 2.2 0.8 - 3.5 K/UL    ABS. MONOCYTES 0.9 0.0 - 1.0 K/UL    ABS. EOSINOPHILS 0.1 0.0 - 0.4 K/UL    ABS. BASOPHILS 0.1 0.0 - 0.1 K/UL    ABS. IMM. GRANS. 0.1 (H) 0.00 - 0.04 K/UL    DF AUTOMATED     TROPONIN I    Collection Time: 08/10/21  3:14 PM   Result Value Ref Range    Troponin-I, Qt. 2.02 (H) <0.05 ng/mL   PROCALCITONIN    Collection Time: 08/10/21  3:14 PM   Result Value Ref Range    Procalcitonin 2.27 ng/mL   METABOLIC PANEL, COMPREHENSIVE    Collection Time: 08/10/21  3:14 PM   Result Value Ref Range    Sodium 140 136 - 145 mmol/L    Potassium 3.8 3.5 - 5.1 mmol/L    Chloride 111 (H) 97 - 108 mmol/L    CO2 23 21 - 32 mmol/L    Anion gap 6 5 - 15 mmol/L    Glucose 94 65 - 100 mg/dL    BUN 11 6 - 20 MG/DL    Creatinine 0.97 0.70 - 1.30 MG/DL    BUN/Creatinine ratio 11 (L) 12 - 20      GFR est AA >60 >60 ml/min/1.73m2    GFR est non-AA >60 >60 ml/min/1.73m2    Calcium 7.8 (L) 8.5 - 10.1 MG/DL    Bilirubin, total 0.8 0.2 - 1.0 MG/DL    ALT (SGPT) 31 12 - 78 U/L    AST (SGOT) 39 (H) 15 - 37 U/L    Alk.  phosphatase 64 45 - 117 U/L    Protein, total 6.0 (L) 6.4 - 8.2 g/dL    Albumin 3.0 (L) 3.5 - 5.0 g/dL    Globulin 3.0 2.0 - 4.0 g/dL    A-G Ratio 1.0 (L) 1.1 - 2.2     MAGNESIUM    Collection Time: 08/10/21  3:14 PM   Result Value Ref Range    Magnesium 2.4 1.6 - 2.4 mg/dL   CK W/ CKMB & INDEX    Collection Time: 08/10/21  3:14 PM   Result Value Ref Range    CK - MB 8.1 (H) <3.6 NG/ML    CK-MB Index 0.7 0.0 - 2.5      CK 1,111 (H) 39 - 308 U/L   CBC WITH AUTOMATED DIFF    Collection Time: 08/11/21  3:22 AM   Result Value Ref Range    WBC 11.3 (H) 4.1 - 11.1 K/uL    RBC 5.01 4.10 - 5.70 M/uL    HGB 14.8 12.1 - 17.0 g/dL    HCT 44.5 36.6 - 50.3 %    MCV 88.8 80.0 - 99.0 FL    MCH 29.5 26.0 - 34.0 PG    MCHC 33.3 30.0 - 36.5 g/dL    RDW 14.2 11.5 - 14.5 %    PLATELET 887 359 - 257 K/uL    MPV 10.1 8.9 - 12.9 FL    NRBC 0.0 0  WBC    ABSOLUTE NRBC 0.00 0.00 - 0.01 K/uL    NEUTROPHILS 71 32 - 75 %    LYMPHOCYTES 19 12 - 49 %    MONOCYTES 8 5 - 13 %    EOSINOPHILS 1 0 - 7 %    BASOPHILS 1 0 - 1 %    IMMATURE GRANULOCYTES 0 0.0 - 0.5 %    ABS. NEUTROPHILS 8.1 (H) 1.8 - 8.0 K/UL    ABS. LYMPHOCYTES 2.2 0.8 - 3.5 K/UL    ABS. MONOCYTES 0.9 0.0 - 1.0 K/UL    ABS. EOSINOPHILS 0.1 0.0 - 0.4 K/UL    ABS. BASOPHILS 0.1 0.0 - 0.1 K/UL    ABS. IMM.  GRANS. 0.0 0.00 - 0.04 K/UL    DF AUTOMATED     METABOLIC PANEL, BASIC    Collection Time: 08/11/21  3:22 AM   Result Value Ref Range    Sodium 139 136 - 145 mmol/L    Potassium 4.6 3.5 - 5.1 mmol/L    Chloride 112 (H) 97 - 108 mmol/L    CO2 21 21 - 32 mmol/L    Anion gap 6 5 - 15 mmol/L    Glucose 90 65 - 100 mg/dL    BUN 10 6 - 20 MG/DL    Creatinine 0.99 0.70 - 1.30 MG/DL    BUN/Creatinine ratio 10 (L) 12 - 20      GFR est AA >60 >60 ml/min/1.73m2    GFR est non-AA >60 >60 ml/min/1.73m2    Calcium 8.0 (L) 8.5 - 10.1 MG/DL   TROPONIN I    Collection Time: 08/11/21  3:22 AM   Result Value Ref Range    Troponin-I, Qt. 1.25 (H) <0.05 ng/mL   LIPID PANEL    Collection Time: 08/11/21  3:22 AM   Result Value Ref Range    Cholesterol, total 104 <200 MG/DL    Triglyceride 53 <150 MG/DL    HDL Cholesterol 44 MG/DL    LDL, calculated 49.4 0 - 100 MG/DL    VLDL, calculated 10.6 MG/DL    CHOL/HDL Ratio 2.4 0.0 - 5.0          Benita Crooks III, DO

## 2021-08-11 NOTE — CARDIO/PULMONARY
Cardiac Rehab Note: chart review/referral     NSTEMI, cardiac cath 8/11/21:  \"no CAD, nml LVEDP\"  Cardiac MRI ordered. Smoking history assessed. Patient is a non smoker. Smoking Cessation Program link has not been added to the AVS.     Per cardiology consult note: \"Trying to be a professional  (oral and injectable anabolic steroids for the last two years). Rare etoh. No tobacco/etoh. 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 \"    Also:  1. \"Got lovenox and ASA last night  2. Long discussion with the patient and his father. 3. Need to exclude SCAD. There are case reports of SCAD in young people with anabolic steroid use. 4. I have recommended diagnostic cath for definitive evaluation of the patient's coronary anatomy and PCI if appropriate. All risks, benefits, and alternatives were discussed and the patient wishes to proceed. 5. NPO  6. If cath is negative will order cardiac MRI to exclude myopericarditis. 7. Continue ASA 81mg  8. Continue metoprolol 25mg q12h  9. Add statin  Iff all above cardiac testing is negative would place on 30 day EVR\"    Patient not seen at this time due to current condition as indicated in chart and ongoing testing.

## 2021-08-11 NOTE — PROGRESS NOTES
MRI Pending:    Need completed online [employer] MRI screening form. Sign and fax to [phone]. Call [phone] once faxed.

## 2021-08-11 NOTE — PROGRESS NOTES
Problem: General Medical Care Plan  Goal: *Anxiety reduced or absent  Outcome: Progressing Towards Goal     Problem: Patient Education: Go to Patient Education Activity  Goal: Patient/Family Education  Outcome: Progressing Towards Goal

## 2021-08-12 ENCOUNTER — APPOINTMENT (OUTPATIENT)
Dept: MRI IMAGING | Age: 21
DRG: 682 | End: 2021-08-12
Attending: INTERNAL MEDICINE
Payer: COMMERCIAL

## 2021-08-12 VITALS
SYSTOLIC BLOOD PRESSURE: 121 MMHG | HEART RATE: 85 BPM | RESPIRATION RATE: 17 BRPM | DIASTOLIC BLOOD PRESSURE: 68 MMHG | BODY MASS INDEX: 25.61 KG/M2 | HEIGHT: 67 IN | WEIGHT: 163.14 LBS | OXYGEN SATURATION: 97 % | TEMPERATURE: 98.2 F

## 2021-08-12 LAB
ALBUMIN SERPL-MCNC: 3.1 G/DL (ref 3.5–5)
ALBUMIN/GLOB SERPL: 0.8 {RATIO} (ref 1.1–2.2)
ALP SERPL-CCNC: 67 U/L (ref 45–117)
ALT SERPL-CCNC: 30 U/L (ref 12–78)
ANION GAP SERPL CALC-SCNC: 4 MMOL/L (ref 5–15)
AST SERPL-CCNC: 24 U/L (ref 15–37)
BASOPHILS # BLD: 0.1 K/UL (ref 0–0.1)
BASOPHILS NFR BLD: 1 % (ref 0–1)
BILIRUB SERPL-MCNC: 0.7 MG/DL (ref 0.2–1)
BUN SERPL-MCNC: 12 MG/DL (ref 6–20)
BUN/CREAT SERPL: 11 (ref 12–20)
CALCIUM SERPL-MCNC: 8.9 MG/DL (ref 8.5–10.1)
CHLORIDE SERPL-SCNC: 109 MMOL/L (ref 97–108)
CK MB CFR SERPL CALC: 0.4 % (ref 0–2.5)
CK MB SERPL-MCNC: 1.4 NG/ML (ref 5–25)
CK SERPL-CCNC: 379 U/L (ref 39–308)
CO2 SERPL-SCNC: 24 MMOL/L (ref 21–32)
CREAT SERPL-MCNC: 1.07 MG/DL (ref 0.7–1.3)
DATE LAST DOSE: NORMAL
DIFFERENTIAL METHOD BLD: NORMAL
EOSINOPHIL # BLD: 0.2 K/UL (ref 0–0.4)
EOSINOPHIL NFR BLD: 3 % (ref 0–7)
ERYTHROCYTE [DISTWIDTH] IN BLOOD BY AUTOMATED COUNT: 13.8 % (ref 11.5–14.5)
GLOBULIN SER CALC-MCNC: 3.9 G/DL (ref 2–4)
GLUCOSE SERPL-MCNC: 96 MG/DL (ref 65–100)
HCT VFR BLD AUTO: 48.8 % (ref 36.6–50.3)
HGB BLD-MCNC: 16.1 G/DL (ref 12.1–17)
IMM GRANULOCYTES # BLD AUTO: 0 K/UL (ref 0–0.04)
IMM GRANULOCYTES NFR BLD AUTO: 0 % (ref 0–0.5)
LYMPHOCYTES # BLD: 1.9 K/UL (ref 0.8–3.5)
LYMPHOCYTES NFR BLD: 26 % (ref 12–49)
MCH RBC QN AUTO: 29.7 PG (ref 26–34)
MCHC RBC AUTO-ENTMCNC: 33 G/DL (ref 30–36.5)
MCV RBC AUTO: 89.9 FL (ref 80–99)
MONOCYTES # BLD: 0.7 K/UL (ref 0–1)
MONOCYTES NFR BLD: 10 % (ref 5–13)
NEUTS SEG # BLD: 4.2 K/UL (ref 1.8–8)
NEUTS SEG NFR BLD: 60 % (ref 32–75)
NRBC # BLD: 0 K/UL (ref 0–0.01)
NRBC BLD-RTO: 0 PER 100 WBC
PLATELET # BLD AUTO: 220 K/UL (ref 150–400)
PMV BLD AUTO: 10.3 FL (ref 8.9–12.9)
POTASSIUM SERPL-SCNC: 4.6 MMOL/L (ref 3.5–5.1)
PROT SERPL-MCNC: 7 G/DL (ref 6.4–8.2)
RBC # BLD AUTO: 5.43 M/UL (ref 4.1–5.7)
REPORTED DOSE,DOSE: NORMAL UNITS
REPORTED DOSE/TIME,TMG: NORMAL
SODIUM SERPL-SCNC: 137 MMOL/L (ref 136–145)
TROPONIN I SERPL-MCNC: 0.62 NG/ML
VANCOMYCIN TROUGH SERPL-MCNC: 6.2 UG/ML (ref 5–10)
WBC # BLD AUTO: 7 K/UL (ref 4.1–11.1)

## 2021-08-12 PROCEDURE — 84484 ASSAY OF TROPONIN QUANT: CPT

## 2021-08-12 PROCEDURE — 80053 COMPREHEN METABOLIC PANEL: CPT

## 2021-08-12 PROCEDURE — 36415 COLL VENOUS BLD VENIPUNCTURE: CPT

## 2021-08-12 PROCEDURE — 74011250637 HC RX REV CODE- 250/637: Performed by: INTERNAL MEDICINE

## 2021-08-12 PROCEDURE — A9576 INJ PROHANCE MULTIPACK: HCPCS | Performed by: INTERNAL MEDICINE

## 2021-08-12 PROCEDURE — 85025 COMPLETE CBC W/AUTO DIFF WBC: CPT

## 2021-08-12 PROCEDURE — 74011636320 HC RX REV CODE- 636/320: Performed by: INTERNAL MEDICINE

## 2021-08-12 PROCEDURE — 75561 CARDIAC MRI FOR MORPH W/DYE: CPT

## 2021-08-12 PROCEDURE — 75561 CARDIAC MRI FOR MORPH W/DYE: CPT | Performed by: INTERNAL MEDICINE

## 2021-08-12 PROCEDURE — 82550 ASSAY OF CK (CPK): CPT

## 2021-08-12 PROCEDURE — 80202 ASSAY OF VANCOMYCIN: CPT

## 2021-08-12 PROCEDURE — 74011250636 HC RX REV CODE- 250/636: Performed by: INTERNAL MEDICINE

## 2021-08-12 RX ORDER — GUAIFENESIN 100 MG/5ML
81 LIQUID (ML) ORAL DAILY
Qty: 30 TABLET | Refills: 1 | Status: SHIPPED
Start: 2021-08-13

## 2021-08-12 RX ORDER — METOPROLOL TARTRATE 25 MG/1
25 TABLET, FILM COATED ORAL EVERY 12 HOURS
Qty: 30 TABLET | Refills: 1 | Status: SHIPPED | OUTPATIENT
Start: 2021-08-12

## 2021-08-12 RX ADMIN — POTASSIUM CHLORIDE AND SODIUM CHLORIDE: 450; 150 INJECTION, SOLUTION INTRAVENOUS at 06:31

## 2021-08-12 RX ADMIN — VANCOMYCIN HYDROCHLORIDE 1000 MG: 1 INJECTION, POWDER, LYOPHILIZED, FOR SOLUTION INTRAVENOUS at 13:09

## 2021-08-12 RX ADMIN — GADOTERIDOL 22 ML: 279.3 INJECTION, SOLUTION INTRAVENOUS at 14:23

## 2021-08-12 RX ADMIN — VANCOMYCIN HYDROCHLORIDE 750 MG: 750 INJECTION, POWDER, LYOPHILIZED, FOR SOLUTION INTRAVENOUS at 03:36

## 2021-08-12 RX ADMIN — ASPIRIN 81 MG: 81 TABLET, CHEWABLE ORAL at 09:01

## 2021-08-12 RX ADMIN — METOPROLOL TARTRATE 25 MG: 25 TABLET, FILM COATED ORAL at 09:01

## 2021-08-12 RX ADMIN — Medication 10 ML: at 13:11

## 2021-08-12 RX ADMIN — Medication 10 ML: at 06:31

## 2021-08-12 NOTE — PROGRESS NOTES
Progress Note      8/12/2021 8:15 AM  NAME: Dyllan Estrella   MRN:  [id number]   Admit Diagnosis: Hypokalemia [E87.6]  Tachycardia [R00.0]  PAZ (acute kidney injury) ([facility] [address] [N17.9]       Patient PCP: None  ________________________________________________________________________     Assessment:     1. NSTEMI  2. Chest pain  3. Hypokalemia; resolved  4. Sinus tachycardia  5. Leukocytosis  6. Trying to be a professional  (oral and injectable anabolic steroids for the last two years). Rare etoh. No tobacco/etoh. 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 Plan: TnI to 2  CK 1111  CTA neg for PE  Echo w/ normal LVEF, no sig valve disease  TSH normal  UDS negative    WBC 17; trending down    CP has improved. 1. On ASA  2. Long discussion with the patient and his father. 3. Need to exclude SCAD. There are case reports of SCAD in young people with anabolic steroid use. > cath was unremarkable   4. NPO  5. MRI pending   6. May be rhabdo w/ all of his workout regimen, etc.  7. Continue metoprolol 25mg q12h  8. Add statin  9. Iff all above cardiac testing is negative would place on 30 day EVR    Thank you for this consult and allowing me to take part in this patients care. Please call with questions. Update: 8/12/2021  CP is resolved. Cath and Echo unremarkable. CTA was okay. MRI pending. If Cardiac MRI okay then can be discharged later today   On asa, statin, metoprolol        [x]        High complexity decision making was performed      Subjective:     HPI:  No CP or sob. ROS:     Objective:      Physical Exam:    Last 24hrs VS reviewed since prior progress note.  Most recent are:    Visit Vitals  /65 (BP 1 Location: Left upper arm, BP Patient Position: At rest)   Pulse 99   Temp 97.9 °F (36.6 °C)   Resp 14   Ht 5' 7\" (1.702 m)   Wt 74 kg (163 lb 2.3 oz)   SpO2 98%   BMI 25.55 kg/m²       Intake/Output Summary (Last 24 hours) at 8/12/2021 0815  Last data filed at 8/12/2021 0631  Gross per 24 hour   Intake 2581.66 ml   Output    Net 2581.66 ml          General: Alert and oriented x3, no acute distress   Neck: Supple   Respiratory: No respiratory distress, clear lung sound   Cardiovascular: Regular rate rhythm, S1S2, no murmur   Abdomen: soft, non tender, non distended   Neuro: moves all extremities, oriented x3   Skin: warm and dry   Extremity: no edema, warm to touch        Data Review    Telemetry: normal sinus rhythm        Lab Data Personally Reviewed:    Recent Labs     08/12/21 0305 08/11/21 0322   WBC 7.0 11.3*   HGB 16.1 14.8   HCT 48.8 44.5    201     No results for input(s): INR, PTP, APTT, INREXT in the last 72 hours. Recent Labs     08/12/21  0305 08/11/21  0322 08/10/21  1514    139 140   K 4.6 4.6 3.8   * 112* 111*   CO2 24 21 23   BUN 12 10 11   CREA 1.07 0.99 0.97   GLU 96 90 94   CA 8.9 8.0* 7.8*   MG  --   --  2.4     Recent Labs     08/12/21  0305 08/11/21  0322 08/10/21  1514   *  --  1,111*   CKNDX 0.4  --  0.7   TROIQ 0.62* 1.25* 2.02*     Lab Results   Component Value Date/Time    Cholesterol, total 104 08/11/2021 03:22 AM    HDL Cholesterol 44 08/11/2021 03:22 AM    LDL, calculated 49.4 08/11/2021 03:22 AM    Triglyceride 53 08/11/2021 03:22 AM    CHOL/HDL Ratio 2.4 08/11/2021 03:22 AM       Recent Labs     08/12/21  0305 08/10/21  1514 08/09/21  0859   AP 67 64 110   TP 7.0 6.0* 6.7   ALB 3.1* 3.0* 3.8   GLOB 3.9 3.0 2.9     No results for input(s): PH, PCO2, PO2 in the last 72 hours.     Medications Personally Reviewed:    Current Facility-Administered Medications   Medication Dose Route Frequency    sodium chloride (NS) flush 5-40 mL  5-40 mL IntraVENous Q8H    sodium chloride (NS) flush 5-40 mL  5-40 mL IntraVENous PRN    atorvastatin (LIPITOR) tablet 20 mg  20 mg Oral QHS    0.45% sodium chloride with KCl 20 mEq/L infusion   IntraVENous CONTINUOUS    aspirin chewable tablet 81 mg  81 mg Oral DAILY    metoprolol tartrate (LOPRESSOR) tablet 25 mg  25 mg Oral Q12H    cefTRIAXone (ROCEPHIN) 2 g in 0.9% sodium chloride (MBP/ADV) 50 mL MBP  2 g IntraVENous Q24H    vancomycin (VANCOCIN) 750 mg in 0.9% sodium chloride 250 mL (VIAL-MATE)  750 mg IntraVENous Q8H    sodium chloride (NS) flush 5-40 mL  5-40 mL IntraVENous Q8H    sodium chloride (NS) flush 5-40 mL  5-40 mL IntraVENous PRN    acetaminophen (TYLENOL) tablet 650 mg  650 mg Oral Q6H PRN    Or    acetaminophen (TYLENOL) suppository 650 mg  650 mg Rectal Q6H PRN    polyethylene glycol (MIRALAX) packet 17 g  17 g Oral DAILY PRN    ondansetron (ZOFRAN ODT) tablet 4 mg  4 mg Oral Q8H PRN    Or    ondansetron (ZOFRAN) injection 4 mg  4 mg IntraVENous Q6H PRN              Sivan Mei MD

## 2021-08-12 NOTE — DISCHARGE SUMMARY
Hospitalist Discharge Note    NAME: Anusha Rivera   :  2000   MRN:  [id number]     Admit date: 2021    Discharge date: 21    PCP: None    Discharge Diagnoses:    SIRS POA WBC 17.2, HR 140s, normal lactate    Sinus tachycardia HRs to 140s POA    Elevated troponin 2.02 POA    Chest pain POA associated with SOB, worse with breathing    Acute kidney injury POA Creat 1.61 now resolved    Severe hypokalemia K 2.1 POA resoved    Rhabdomyolysis POA CPK 1,111    Code Status: Full    Discharge Medications:  Current Discharge Medication List      START taking these medications    Details   aspirin 81 mg chewable tablet Take 1 Tablet by mouth daily. Qty: 30 Tablet, Refills: 1      metoprolol tartrate (LOPRESSOR) 25 mg tablet Take 1 Tablet by mouth every twelve (12) hours. Qty: 30 Tablet, Refills: 1         STOP taking these medications       OTHER Comments:   Reason for Stopping: Follow-up Information     Follow up With Specialties Details Why Contact Info    Attila Leigh NP  On 2021 at 09:30 am.  [address] [place], [address]  Phone #:  [phone]    Mariana Perrin III, DO Cardiology Schedule an appointment as soon as possible for a visit in 3 weeks  [address]  [address]  [facility] [phone]            Time spent on discharge:   I spent greater than 30 minutes on discharge, seeing and examining the patient, reconciling home meds and new meds, coordinating care with case management, doing the discharge papers and the D/C summary    Discharge disposition: home    Discharge Condition: Stable    Summary of admission H+P(copied from Dr Dawood Mejia Note):     CHIEF COMPLAINT: Nausea and vomiting     HISTORY OF PRESENT ILLNESS:     Anusha Rivera is a 50-year-old male with no known past medical history presenting to the ED with complaint of nausea and vomiting that began this morning.  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 steroids prior to his workout regimens. He states he arose earlier this morning around 6:15 AM and took a supplement called \"anadrol\" and began to feel shaky, nauseous, and experience palpitations. He has never experienced similar sensations. He denies fevers or chills, denies CP, denies respiratory symps     We were asked to admit for work up and evaluation of the above problems.      No past medical history     Admit pCXR read by radiology FINDINGS:   The lungs are clear. The central airways are patent. No pneumothorax or pleural  effusion. IMPRESSION  Clear lungs. CTA chest read by radiology FINDINGS:  THYROID: No nodule. MEDIASTINUM: No mass or lymphadenopathy. FRANCISCO J: No mass or lymphadenopathy. THORACIC AORTA: No aneurysm. MAIN PULMONARY ARTERY: Normal in caliber. There is no evidence for pulmonary  embolism. TRACHEA/BRONCHI: Patent. ESOPHAGUS: No wall thickening or dilatation. HEART: Normal in size. PLEURA: There are small bilateral pleural effusions left greater than right. .  LUNGS: There are dependent areas of subpleural opacity posteriorly in each lower  lobe adjacent to the pleural effusions right greater than left. This is  consistent with areas of atelectasis posteriorly in each lower lobe. Additionally, there is mild smooth thickening of the interlobular septum  dependently in each lung. This is consistent with increased extravascular  volume. Correlation with fluid balance is suggested. This is consistent with  mild interstitial edema. .  INCIDENTALLY IMAGED UPPER ABDOMEN: Small lipoma is noted in the dome of the  liver. This is not significant. Marnell Records BONES: No destructive bone lesion. IMPRESSION  1. No evidence for pulmonary embolism. 2. Interval development of small bilateral pleural effusions right greater than  left with associated areas of bibasilar atelectasis.  Additionally there is  smooth thickening of interlobular septa consistent with increased extravascular  volume and mild interstitial edema. Correlation with fluid balance is suggested. Echo TTE read by cardiology results  Left Ventricle Normal cavity size, wall thickness, systolic function (ejection fraction normal) and diastolic function. The estimated EF is 60 - 65%. Wall Scoring The left ventricular wall motion is normal.            Left Atrium Normal cavity size. Right Ventricle Normal cavity size and global systolic function. Right Atrium Normal cavity size. Interatrial Septum Interatrial septal aneurysm present. Aortic Valve Normal valve structure, no stenosis and no regurgitation. Mitral Valve Normal valve structure, no stenosis and no regurgitation. Tricuspid Valve Normal valve structure and no stenosis. Trace regurgitation. Pulmonic Valve Pulmonic valve normal doppler findings. Normal valve structure, no stenosis and no regurgitation. Pulmonary Artery Pulmonary hypertension not suggested by Doppler findings. IVC/Hepatic Veins Inferior vena cava not assessed. Pericardium Trivial pericardial effusion noted. Effusion is located adjacent to the right atrium. Cardiac cath per Dr Royal Cristobal note:  Dajuan Rogerson: []? Right  []? Left  [x]? Mixed  LM: Large caliber vessel without significant stenosis  Ramus: Small caliber vessel without significant stenosis. LAD: Large caliber vessel that wraps around the apex without significant stenosis. D1: Small caliber vessel without significant stenosis. D2: Moderate caliber vessel without significant stenosis. LCX: Large caliber codominant vessel without significant stenosis. OM1: Moderate caliber vessel without significant stenosis. OM2: Moderate caliber vessel without significant stenosis. PDA: Moderate caliber vessel without significant stenosis. PLB: Small caliber vessel without significant stenosis. RCA: Large caliber codominant vessel without significant stenosis.    PDA: Moderate caliber vessel without significant stenosis. PLB: Moderate caliber vessel without significant stenosis. Normal LVEDP     Cardiac MRI read by radiology IMPRESSION  1. Normal left ventricular cavity size with normal left ventricular systolic  function. LVEF 60%. 2. Normal right ventricular size and systolic function. RVEF 60%. 3. No significant valvular disease. 4. No significant myocardial or pericardial edema is noted on T2 W imaging. On  EGE and LGE study, there is no areas of myocardial enhancement. There is no  myocardial scar. There is no features of recent or old myocardial infarction. There is no features of infiltrative sarcoidosis or cardiac amyloidosis. There  is no features of inflammatory myocarditis. All myocardial walls are viable. 5. Normal pericardial thickness. No significant edema or significant pericardial  enhancement is noted on postcontrast imaging. No significant pericardial  inflammation. Trace circumferential pericardial effusion is present. 6. Trace bilateral pleural effusions are present.       Hospital course:     SIRS POA WBC 17.2, HR 140s, normal lactate  Sinus tachycardia HRs to 140s POA  Elevated troponin 2.02 POA  Chest pain POA associated with SOB, worse with breathing  - presented with N/V, palpitations x 1 day after using steroids  - Low K on labs, SIRS criteria  - Patient is afebrile, no leukocytosis, unclear if infection present        No clear ASD on imaging        UA negative        Blood cultures pre -antibiotics negative at 2 days  - Empiric ceftriaxone and vanco x days, stop has all cultures negative  - Significant hyperglycemia also noted on admission BMP with blood sugar of 275, no known history of diabetes            HgBa1c was 5.0, suspect stress response and steroids, BS in 90s at discharge  - Tachycardia improved with IVF, lopressor  - initial troponin was < 0.05, then bumped to 2.02  - CTA chest with no ASD, dissection or PE  - Echo LVEF 60-65%, normal cavity size, trivial pericardial effusion, normal valves  - Normal TSH  -  LDL 49.6  - Appreciate Dr Shaw's input, cardiac cath with normal LVEDP, normal coronaries  - MRI cardiac with no evidence of myopericarditis  - Troponin trending down to 0.6  - Suspect triggered by the steroids, D/W patient  - Spoke with patient at length about need to stop the steroids and testosterone use  - no working our or weightlifting for next 2 weeks, until he follow up   D/w patient needs time to recover    Acute kidney injury POA Creat 1.61 now resolved  Severe hypokalemia K 2.1 POA resoved  IVF, K supplemented, now improved  Serial labs  K 4.6 and creatinine 1.07 at discharge    Rhabdomyolysis POA CPK 1,111  Occurred in setting of anabolic steroid and testosterone use, aggressive weight lifting  IVF  CPK improved to 379 at discharge    Code Status: Full  Surrogate Decision Maker:     DVT Prophylaxis:   GI Prophylaxis: not indicated     Baseline: , engaged in weightlifting and admiting regular use of anabolic steroids    Subjective:     Chief Complaint / Reason for Physician Visit follow-up SIRS, acute kidney injury  \"No problems\". Discussed with RN events overnight. Feels back to normal, no complaints  Seen with Mother in room  No SOB  No substernal CP  Cardiac MRI was negative    Review of Systems:  Symptom Y/N Comments  Symptom Y/N Comments   Fever/Chills n   Chest Pain n    Poor Appetite    Edema     Cough n   Abdominal Pain n    Sputum    Joint Pain     SOB/ULLOA n   Headache n    Nausea/vomit n   Tolerating PT/OT     Diarrhea n   Tolerating Diet y    Constipation    Other       Could NOT obtain due to:      Objective:     VITALS:   Last 24hrs VS reviewed since prior progress note.  Most recent are:  Patient Vitals for the past 24 hrs:   Temp Pulse Resp BP SpO2   08/12/21 1452 98.2 °F (36.8 °C) 85  121/68 97 %   08/12/21 1139 98.3 °F (36.8 °C) 82 17 (!) 111/55 97 %   08/12/21 0758 97.9 °F (36.6 °C) 99 14 117/65 98 % 08/12/21 0311 98.2 °F (36.8 °C) 69 16 (!) 113/58 99 %   08/11/21 2225 98.7 °F (37.1 °C) 93 16 127/68 96 %   08/11/21 2124  92  116/61    08/11/21 1958 98.3 °F (36.8 °C) 92 16 94/68 98 %   08/11/21 1830  (!) 116  125/60 98 %       Intake/Output Summary (Last 24 hours) at 8/12/2021 1726  Last data filed at 8/12/2021 0631  Gross per 24 hour   Intake 2581.66 ml   Output    Net 2581.66 ml        Wt Readings from Last 12 Encounters:   08/09/21 74 kg (163 lb 2.3 oz)       PHYSICAL EXAM:    I had a face to face encounter and independently examined this patient on 08/12/21 as outlined below:    General: WD, WN. Alert, cooperative, no acute distress    EENT:  PERRL. Anicteric sclerae. MMM  Resp:  CTA bilaterally, no wheezing or rales. No accessory muscle use  CV:  Regular  rhythm,  No edema  GI:  Soft, Non distended, Non tender. +Bowel sounds, no rebound  Neurologic:  Alert and oriented X 3, normal speech, non focal motor exam  Psych:   Good insight. Not anxious nor agitated  Skin:  No rashes. No jaundice    Reviewed most current lab test results and cultures  YES  Reviewed most current radiology test results   YES  Review and summation of old records today    NO  Reviewed patient's current orders and MAR    YES  PMH/SH reviewed - no change compared to H&P  ________________________________________________________________________  Care Plan discussed with:    Comments   Patient x    Family  x Mother   RN x    Care Manager     Consultant  x Dr Kathryn Garibay team rounds were held today with , nursing, pharmacist and clinical coordinator. Patient's plan of care was discussed; medications were reviewed and discharge planning was addressed.      ________________________________________________________________________      Comments   >50% of visit spent in counseling and coordination of care     ________________________________________________________________________  Ann Maxcy Neema Harper MD     Procedures: see electronic medical records for all procedures/Xrays and details which were not copied into this note but were reviewed prior to creation of Plan. LABS:  I reviewed today's most current labs and imaging studies.   Pertinent labs include:  Recent Labs     08/12/21  0305 08/11/21  0322 08/10/21  1514   WBC 7.0 11.3* 15.6*   HGB 16.1 14.8 14.9   HCT 48.8 44.5 44.8    201 224     Recent Labs     08/12/21  0305 08/11/21  0322 08/10/21  1514    139 140   K 4.6 4.6 3.8   * 112* 111*   CO2 24 21 23   GLU 96 90 94   BUN 12 10 11   CREA 1.07 0.99 0.97   CA 8.9 8.0* 7.8*   MG  --   --  2.4   ALB 3.1*  --  3.0*   TBILI 0.7  --  0.8   ALT 30  --  31

## 2021-08-12 NOTE — ADT AUTH CERT NOTES
Myocardial Infarction - Care Day (8/11/2021) by Criselda Arteaga       Review Status Review Entered   Completed 8/12/2021 10:11      Criteria Review      Care Day:  Care Date: 8/11/2021 Level of Care: Telemetry    Guideline Day    Level Of Care    (X) ICU or intermediate care after emergency treatment    8/12/2021 10:11:43 EDT by Britta Olmos      Tele    Clinical Status    (X) * Clinical Indications met    8/12/2021 10:11:43 EDT by Britta Olmos      Pt adm 2 days ago for c/p, dehydration, tachycardia and anabolic steroid use. Troponin elevated last night/today and cardiology dx with NSTEMI-to [facility]. R/O SCAD as noted in young adults with anabolic steroid use. SIRS dx last night    Routes    (X) Parenteral and oral medications    8/12/2021 10:11:43 EDT by Britta Olmos      Rocephin 2g IV Q24H, vancomycin 750mg IV Q8H    (X) Oral hydration    8/12/2021 10:11:43 EDT by [facility], [address] [place] [address] with 20meq kcl/liter at 100cc/hr    (X) Clear liquid diet    8/12/2021 10:11:43 EDT by Britta Olmos      REgular diet post cath/NPO prior to cath    Interventions    (X) ECG, cardiac biomarkers    8/12/2021 10:11:43 EDT by Britta [facility]      HR intermittantly tachycardic: sustained 102-114 from 4465-2465. 98.3, 97/58, 18, RA sat 97%. Trop 2.02 on 8/10 at 1514 and 1.25 today. CK 1,111 on 8/10 at 1514, CK-MB 8.1. Chl 112, rashi 8.0. Lipid panel all wnl. (X) Fibrinolysis or percutaneous coronary intervention    8/12/2021 10:11:43 EDT by Jose A Case Pt to cardiac cath-No CAD, normal LVEDP.  Rec medical therapy, cardiac MRI    Medications    (X) Antiplatelet agents (eg, aspirin, clopidogrel, ticagrelor, IV cangrelor)    8/12/2021 10:11:43 EDT by Britta Olmos      asa 81mg po qd    (X) Beta-blocker    8/12/2021 10:11:43 EDT by Britta Olmos      lopressor 25mg po q12H    (X) Statin    8/12/2021 10:11:43 EDT by Britta Olmos      lipitor 20mg po qhs    * Milestone   Additional Notes   Assessment / Plan:       SIRS POA WBC 17.2, HR 130s, normal lactate   Sinus tachycardia POA   Rhabdomyolysis POA CPK 1,111   Elevated troponin 2.02 POA   Chest pain POA associated with SOB, worse with breathing   - Occurred in setting of anabolic steroid and testosterone use   - Low K supplemented and resolved   - Patient is afebrile, no leukocytosis, unclear if infection present         No clear ASD on imaging         UA negative         Blood cultures pre -antibiotics are pending. Negative so far   - Procalcitonin 0.97, started on empiric ceftriaxone and vanco               Will wean if BC remain negative   - Significant hyperglycemia also noted on admission BMP with blood sugar of 275, no known history of diabetes             HgBa1c was 5.0, suspect stressresponse   - Tachycardia improved with IVF, lopressor   - CTA chest with no ASD, dissection or PE   - Echo LVEF 60-65%, normal cavity size, trivial pericardial effusion, normal valves   - Normal TSH   - initial troponin was < 0.05, then bumped to 2.02   -  LDL 49.6   - IVF, serial CPKs, ? rhabdo related to steroids or hypokalemia   - Appreciate Dr Bridget Stanford input, cardiac cath with normal LVEDP, normal coronaries   - MRI cardiac to assess for myocarditis   - if work up negative, hopefully home in Am with outpatient follow up   - Spoke with patient at length about need to stop the steroids and testosterone use       Acute kidney injury POA Creat 1.61 now resolved, creatinine 0.99   Severe hypokalemia K 2.1 POA resoved   IVF, K supplemented, now improved   Serial labs       Code Status: Full   Surrogate Decision Maker:       DVT Prophylaxis:    GI Prophylaxis: not indicated       Baseline: , engaged in weightlifting and admiting regular use of anabolic steroids      PHYSICAL EXAM:   General:          WD, WN. Alert, cooperative, no acute distress     EENT:              PERRL. Anicteric sclerae.  MMM   Resp:               CTA bilaterally, no wheezing or rales.  No accessory muscle use   CV:                  Regular  rhythm,  No edema   GI:                   Soft, Non distended, Non tender.  +Bowel sounds, no rebound   Neurologic:       Alert and oriented X 3, normal speech, non focal motor exam   Psych:   Good insight. Not anxious nor agitated   Skin:                No rashes.  No jaundice

## 2021-08-12 NOTE — DISCHARGE INSTRUCTIONS
.    Patient Discharge Instructions    Caron Ascencio / [id number] : 2000    Admitted 2021 Discharged: 2021         DISCHARGE DIAGNOSIS:       Hypokalemia (2021)    Tachycardia (2021)    PAZ (acute kidney injury) ([facility] [address] (2021)    Type 2 MI resolved           What to do at Home    1. Recommended diet: Regular Diet    2. Recommended activity: Activity as tolerated, no exercise or weight lifting for next week till you follow up    3. If you experience any of the following symptoms then please call your primary care physician or return to the emergency room if you cannot get hold of your doctor:   Fevers > 100.5, chills   Nausea or vomiting, persistent diarrhea > 24 hours   Blood in stool or black stools   Chest pain or SOB      Follow-up Information     Follow up With Specialties Details Why Contact Info    Myah Olivarez NP  On 2021 at 09:30 am.  [address] [place], [address] [place] [address] [facility]  Phone #:  [phone]    Ligia Villarreal DO Cardiology Schedule an appointment as soon as possible for a visit in 3 weeks  [address]  [address]  [address] [phone]                Information obtained by :  I understand that if any problems occur once I am at home I am to contact my physician. I understand and acknowledge receipt of the instructions indicated above.                                                                                                                                            Physician's or R.N.'s Signature                                                                  Date/Time                                                                                                                                              Patient or Representative Signature                                                          Date/Time

## 2021-08-12 NOTE — PROGRESS NOTES
Pharmacy Antimicrobial Kinetic Dosing    Indication for Antimicrobials: Sepsis     Current Regimen of Each Antimicrobial:  Vancomycin 750 mg IV every 8 hours (Start Date 8/10; Day # 3)  Ceftriaxone 2 g IV every 24 hours (Start Date 8/10; Day # 3)    Previous Antimicrobial Therapy:  None    Goal Level: AUC: 400-600 mg/hr/Liter/day    Date Dose & Interval Measured (mcg/mL) Extrapolated (mcg/mL)    @ 0305 750 mg every 8 hours 6.2 9.3 ()                 Date & time of next level:  @ 1200    Significant Positive Cultures:   8/10 blood: NGTD x 16 hours- pending  8/10 Blood: NGTD x 12 hours- pending    Conditions for Dosing Consideration: None    Labs:  Recent Labs     21  0305 21  0322 08/10/21  1514   CREA 1.07 0.99 0.97   BUN 12 10 11     Recent Labs     21  0305 21  0322 08/10/21  1514 08/10/21  0102   WBC 7.0 11.3* 15.6* 14.3*     Temp (24hrs), Av.3 °F (36.8 °C), Min:97.9 °F (36.6 °C), Max:98.7 °F (37.1 °C)        Creatinine Clearance (mL/min):   CrCl (Ideal Body Weight): 102.1   If actual weight < IBW: CrCl (Actual Body Weight) 114.3    Impression/Plan:   Vancomycin trough resulted as subtherapeutic at 9.3 mcg/mL. Will change to 1 g IV every 8 hours for an estimated trough of 12.7 mcg/mL but and AUC of 469. Will continue current regimen as appropriate for indication and renal function. Antimicrobial stop date TBD     Pharmacy will follow daily and adjust medications as appropriate for renal function and/or serum levels.     Thank you,  Val Martinez, PHARMD    Vancomycin Dosing Document    Documents located on pharmacy Teams site: Clinical Practice -> Antimicrobial Stewardship -> Antibiotics_Vancomycin     Aminoglycoside Dosing Document    Documents located on pharmacy Teams site: Clinical Practice -> Antimicrobial Stewardship -> Antibiotics_Aminoglycosides

## 2021-08-13 NOTE — PROGRESS NOTES
Physician Progress Note      PATIENT:               Js Bustillo  CSN #:                  [id number]  :                       2000  ADMIT DATE:       2021 8:50 AM  DISCH DATE:        2021 6:08 PM  RESPONDING  PROVIDER #:        Clementine Thakkar MD          QUERY TEXT:    Patient admitted with CP and vomiting, noted to have tachycardia and PAZ. Patient also noted to have Hx, of Anabolic steroid abuse. If possible, please document in progress notes and discharge summary if you are evaluating and/or treating any of the following: The medical record reflects the following:  Risk Factors: Hx: Anabolic steroid use  Clinical Indicators: hr: 102-140; rr: 23-28; wbc: 17.2; bands: 13.8; Lac acid: 1.4; Procal: 0.87; CK: 1,111; Tnl:<0.05 ^ 2.02. [facility] [place] [facility] CTA CHEST: NO PE. ...... [place] [facility] Interval development of small bilateral pleural effusions right greater than  left with associated areas of bibasilar atelectasis. Additionally there is smooth thickening of interlobular septa consistent with increased extravascular volume and mild interstitial edema. .. [place] [facility] EKG: ST..... Treatment: Continue aggressive fluid hydration; Trend BMP; Patient counseled on misuse of controlled substances including steroids and other unregulated internet supplements and encouraged not to take unprescribed medicines; Cardiology consult    Thank you,    Evelia Bain  [facility]    Options provided:  -- Accidental overdose of  Anabolic steroid (specify name of drug/chemical)  -- Intentional overdose of Anabolic steroid (specify name of drug/chemical)  -- Adverse effect of Anabolic steroid (specify name of medication/s), properly administered: Chest pain POA associated with SOB symptoms) are due to an adverse effect of properly administered Anabolic steroid (specify name of medication/s).   -- Other - I will add my own diagnosis  -- Disagree - Not applicable / Not valid  -- Disagree - Clinically unable to determine / Unknown  -- Refer to Clinical Documentation Reviewer    PROVIDER RESPONSE TEXT:    Adverse effect of Anabolic steroid (specify name of medication/s), properly administered: Chest pain POA associated with SOB symptoms) are due to an adverse effect of properly administered Anabolic steroid (specify name of medication/s). Query created by: Ok Bellamy on 8/12/2021 6:13 PM      QUERY TEXT:    Patient admitted with CP and vomiting. Noted documentation of NSTEMI in Cardiology consult dated 8/11. In order to support the diagnosis of NSTEMI, please include additional clinical indicators in your documentation. Or please document if the diagnosis of NSTEMI has been ruled out after further study. The medical record reflects the following:  Risk Factors: C/o CP and Vomiting  Clinical Indicators:  hr: 102-140; rr: 23-28; bp: 115/62; wbc: 17.2; Lac acid: 1.4; Procal: 0.87; CK: 1,111; Tnl:<0.05 ^ 2.02. [place] [facility] CTA CHEST: NO PE. ...... [place] [facility] Interval development of small bilateral pleural effusions right greater than  left with associated areas of bibasilar atelectasis. Additionally there is smooth thickening of interlobular septa consistent with increased extravascular volume and mild interstitial edema. .. [place] [facility] EKG: ST.... [place] [facility] REPEAT EKG: ST. [place] [facility] [place] [facility] Echo: LV: Estimated LVEF is 60 - 65%. Normal cavity size, wall thickness, systolic function (ejection fraction normal) and diastolic function; Pericardium: Trivial pericardial effusion adjacent to right atrium. ... [place] [facility] [place] [facility] Noted per Cards: Cath and Echo unremarkable  Treatment: Tnl; CK; CTA CHEST; EKG; Echo; Cards consult; Cardiac Cath; MRI    Thank you,    Yessi Seay  [facility]    Options provided:  -- NSTEMI present as evidenced by, Please document evidence.   -- NSETMI was ruled out  -- Other - I will add my own diagnosis  -- Disagree - Not applicable / Not valid  -- Disagree - Clinically unable to determine / Unknown  -- Refer to Clinical Documentation Reviewer    PROVIDER RESPONSE TEXT:    NSTEMI is present as evidenced by elevated troponin, chest pain    Query created by: Tin Quintainlla on 8/12/2021 5:57 PM      Electronically signed by:  Nino Gamble MD 8/13/2021 7:47 AM

## 2021-08-16 LAB
BACTERIA SPEC CULT: NORMAL
BACTERIA SPEC CULT: NORMAL
SERVICE CMNT-IMP: NORMAL
SERVICE CMNT-IMP: NORMAL

## 2022-03-19 PROBLEM — E87.6 HYPOKALEMIA: Status: ACTIVE | Noted: 2021-08-09

## 2022-03-19 PROBLEM — N17.9 AKI (ACUTE KIDNEY INJURY) (HCC): Status: ACTIVE | Noted: 2021-08-09

## 2022-03-19 PROBLEM — R00.0 TACHYCARDIA: Status: ACTIVE | Noted: 2021-08-09

## 2023-05-14 RX ORDER — ASPIRIN 81 MG/1
81 TABLET, CHEWABLE ORAL DAILY
COMMUNITY
Start: 2021-08-13

## (undated) DEVICE — GUIDEWIRE VASC L260CM 0.035IN J TIP L3MM PTFE FIX COR NAMIC

## (undated) DEVICE — DRAPE PRB US TRNSDCR 6X96IN --

## (undated) DEVICE — RADIFOCUS OPTITORQUE ANGIOGRAPHIC CATHETER: Brand: OPTITORQUE

## (undated) DEVICE — SYRINGE ANGIO 10 CC BRL STD PRNT POLYCARB LT BLU MEDALLION

## (undated) DEVICE — HI-TORQUE VERSACORE FLOPPY GUIDE WIRE SYSTEM 145 CM: Brand: HI-TORQUE VERSACORE

## (undated) DEVICE — 3M™ TEGADERM™ TRANSPARENT FILM DRESSING FRAME STYLE, 1626W, 4 IN X 4-3/4 IN (10 CM X 12 CM), 50/CT 4CT/CASE: Brand: 3M™ TEGADERM™

## (undated) DEVICE — GLIDESHEATH SLENDER ACCESS KIT: Brand: GLIDESHEATH SLENDER

## (undated) DEVICE — PACK PROCEDURE SURG HRT CATH

## (undated) DEVICE — TUBING PRSS MON L6IN PVC M FEM CONN

## (undated) DEVICE — SPLINT WR POS F/ARTERIAL ACC -- BX/10

## (undated) DEVICE — TR BAND RADIAL ARTERY COMPRESSION DEVICE: Brand: TR BAND

## (undated) DEVICE — SYR ART 700 CLEAR MARK 7 -- ARTERION

## (undated) DEVICE — KIT ANGIOGRAPHY CUST MRMC